# Patient Record
Sex: MALE | Race: WHITE | NOT HISPANIC OR LATINO | Employment: UNEMPLOYED | URBAN - METROPOLITAN AREA
[De-identification: names, ages, dates, MRNs, and addresses within clinical notes are randomized per-mention and may not be internally consistent; named-entity substitution may affect disease eponyms.]

---

## 2020-07-17 ENCOUNTER — OFFICE VISIT - HEALTHEAST (OUTPATIENT)
Dept: FAMILY MEDICINE | Facility: CLINIC | Age: 30
End: 2020-07-17

## 2020-07-17 DIAGNOSIS — F30.9 BIPOLAR I DISORDER, SINGLE MANIC EPISODE (H): ICD-10-CM

## 2020-07-17 DIAGNOSIS — Z72.52 HIGH RISK HOMOSEXUAL BEHAVIOR: ICD-10-CM

## 2020-07-17 DIAGNOSIS — Z59.71 INSURANCE COVERAGE PROBLEMS: ICD-10-CM

## 2020-07-17 RX ORDER — MULTIPLE VITAMINS W/ MINERALS TAB 9MG-400MCG
1 TAB ORAL DAILY
Status: SHIPPED | COMMUNITY
Start: 2020-07-17 | End: 2023-09-06

## 2020-07-17 ASSESSMENT — MIFFLIN-ST. JEOR: SCORE: 1833.29

## 2020-07-20 ENCOUNTER — COMMUNICATION - HEALTHEAST (OUTPATIENT)
Dept: ADMINISTRATIVE | Facility: CLINIC | Age: 30
End: 2020-07-20

## 2020-07-20 ENCOUNTER — COMMUNICATION - HEALTHEAST (OUTPATIENT)
Dept: NURSING | Facility: CLINIC | Age: 30
End: 2020-07-20

## 2020-07-21 ASSESSMENT — ANXIETY QUESTIONNAIRES
3. WORRYING TOO MUCH ABOUT DIFFERENT THINGS: SEVERAL DAYS
IF YOU CHECKED OFF ANY PROBLEMS ON THIS QUESTIONNAIRE, HOW DIFFICULT HAVE THESE PROBLEMS MADE IT FOR YOU TO DO YOUR WORK, TAKE CARE OF THINGS AT HOME, OR GET ALONG WITH OTHER PEOPLE: SOMEWHAT DIFFICULT
4. TROUBLE RELAXING: SEVERAL DAYS
7. FEELING AFRAID AS IF SOMETHING AWFUL MIGHT HAPPEN: NOT AT ALL
2. NOT BEING ABLE TO STOP OR CONTROL WORRYING: MORE THAN HALF THE DAYS
1. FEELING NERVOUS, ANXIOUS, OR ON EDGE: MORE THAN HALF THE DAYS
5. BEING SO RESTLESS THAT IT IS HARD TO SIT STILL: NOT AT ALL
GAD7 TOTAL SCORE: 7
6. BECOMING EASILY ANNOYED OR IRRITABLE: SEVERAL DAYS

## 2020-07-21 ASSESSMENT — PATIENT HEALTH QUESTIONNAIRE - PHQ9: SUM OF ALL RESPONSES TO PHQ QUESTIONS 1-9: 10

## 2020-08-13 ENCOUNTER — COMMUNICATION - HEALTHEAST (OUTPATIENT)
Dept: FAMILY MEDICINE | Facility: CLINIC | Age: 30
End: 2020-08-13

## 2020-08-13 DIAGNOSIS — F30.9 BIPOLAR I DISORDER, SINGLE MANIC EPISODE (H): ICD-10-CM

## 2020-08-19 ENCOUNTER — OFFICE VISIT - HEALTHEAST (OUTPATIENT)
Dept: BEHAVIORAL HEALTH | Facility: CLINIC | Age: 30
End: 2020-08-19

## 2020-08-19 DIAGNOSIS — F30.9 BIPOLAR I DISORDER, SINGLE MANIC EPISODE (H): ICD-10-CM

## 2020-08-19 RX ORDER — QUETIAPINE FUMARATE 50 MG/1
50 TABLET, FILM COATED ORAL AT BEDTIME
Qty: 30 TABLET | Refills: 0 | Status: SHIPPED | OUTPATIENT
Start: 2020-08-19 | End: 2023-09-06

## 2020-08-19 ASSESSMENT — ANXIETY QUESTIONNAIRES
1. FEELING NERVOUS, ANXIOUS, OR ON EDGE: SEVERAL DAYS
4. TROUBLE RELAXING: NOT AT ALL
3. WORRYING TOO MUCH ABOUT DIFFERENT THINGS: SEVERAL DAYS
6. BECOMING EASILY ANNOYED OR IRRITABLE: SEVERAL DAYS
IF YOU CHECKED OFF ANY PROBLEMS ON THIS QUESTIONNAIRE, HOW DIFFICULT HAVE THESE PROBLEMS MADE IT FOR YOU TO DO YOUR WORK, TAKE CARE OF THINGS AT HOME, OR GET ALONG WITH OTHER PEOPLE: SOMEWHAT DIFFICULT
7. FEELING AFRAID AS IF SOMETHING AWFUL MIGHT HAPPEN: SEVERAL DAYS
5. BEING SO RESTLESS THAT IT IS HARD TO SIT STILL: SEVERAL DAYS
GAD7 TOTAL SCORE: 6
2. NOT BEING ABLE TO STOP OR CONTROL WORRYING: SEVERAL DAYS

## 2020-08-19 ASSESSMENT — PATIENT HEALTH QUESTIONNAIRE - PHQ9: SUM OF ALL RESPONSES TO PHQ QUESTIONS 1-9: 5

## 2020-09-12 ENCOUNTER — COMMUNICATION - HEALTHEAST (OUTPATIENT)
Dept: BEHAVIORAL HEALTH | Facility: CLINIC | Age: 30
End: 2020-09-12

## 2020-09-12 DIAGNOSIS — F30.9 BIPOLAR I DISORDER, SINGLE MANIC EPISODE (H): ICD-10-CM

## 2021-05-26 ASSESSMENT — PATIENT HEALTH QUESTIONNAIRE - PHQ9: SUM OF ALL RESPONSES TO PHQ QUESTIONS 1-9: 10

## 2021-05-27 ASSESSMENT — PATIENT HEALTH QUESTIONNAIRE - PHQ9: SUM OF ALL RESPONSES TO PHQ QUESTIONS 1-9: 5

## 2021-05-28 ASSESSMENT — ANXIETY QUESTIONNAIRES
GAD7 TOTAL SCORE: 6
GAD7 TOTAL SCORE: 7

## 2021-06-04 VITALS
HEART RATE: 76 BPM | DIASTOLIC BLOOD PRESSURE: 76 MMHG | HEIGHT: 68 IN | SYSTOLIC BLOOD PRESSURE: 118 MMHG | OXYGEN SATURATION: 94 % | WEIGHT: 201 LBS | BODY MASS INDEX: 30.46 KG/M2

## 2021-06-09 NOTE — PROGRESS NOTES
Chief Complaint   Patient presents with     [facility]     Depression     States that he has been dealing with depression for ever. Was on medication a long time ago but thinks that he stopped due to insurance.      Anxiety       HPI: Patient presents today with worsening depression and anxiety symptoms.  A review of records shows that in the past he has been diagnosed with bipolar and was previously on antipsychotics.  Patient notes that those helped with his mood but he did not like how they made him feel flat.  He had side effects from the him.    Patient denies history of self-harm or suicide attempts.  No current thoughts of hurting himself.  No access to guns.  He enjoys video games and spending time with friends.  Does not exercise.  He does not drink alone.  Occasionally will drink to excess and has a history of a DUI.  He met with psychiatry when he was a minor.  He has never done psychotherapy.  No history of abuse.    Past family medical history includes his mother diagnosed with bipolar.  He thinks that she takes Latuda.    Patient reports having history of same-sex partners.  Cannot remember his last STD check.  He is interested in PrEP.    PHQ and stacey 7 scores recorded in flowsheets.    ROS:Review of Systems - negative except for what's listed in the HPI    SH: The Patient's  reports that he is a non-smoker but has been exposed to tobacco smoke. He has never used smokeless tobacco. He reports current alcohol use of about 1.0 standard drinks of alcohol per week. He reports previous drug use. Drug: Marijuana.      FH: The Patient's family history includes Bipolar disorder in his mother; Clotting disorder in his mother; No Medical Problems in his brother.     Meds:    Current Outpatient Medications on File Prior to Visit   Medication Sig Dispense Refill     Bacillus coagulans/inulin (PROBICHEW ORAL) Take by mouth.       multivitamin with minerals (THERA-M) 9 mg iron-400 mcg Tab tablet Take 1 tablet by  "mouth daily.       UNABLE TO FIND Med Name: Prebiotic       diphenhydrAMINE (BENADRYL) 25 mg capsule Take 25 mg by mouth every 6 (six) hours as needed for itching or allergies (for allergies/itching).       No current facility-administered medications on file prior to visit.        O:  /76   Pulse 76   Ht 5' 7.5\" (1.715 m)   Wt 201 lb (91.2 kg)   SpO2 94%   BMI 31.02 kg/m      Physical Examination:   General appearance - alert, well appearing, and in no distress  Mental status - alert, oriented to person, place, and time  Eyes -PERRLA  Neck -no thyromegaly  Chest - clear to auscultation, no wheezes, rales or rhonchi, symmetric air entry  Heart - normal rate and regular rhythm, S1 and S2 normal, no murmurs noted  Neurological - alert, oriented, normal speech, no focal findings or movement disorder noted, nno tremors, strength 5/5  Extremities - peripheral pulses normal, no pedal edema, no cyanosis  Skin - normal coloration and turgor.  Multiple tattoos      A/P:     Problem List Items Addressed This Visit     Bipolar Disorder - Primary    Relevant Medications    QUEtiapine (SEROQUEL) 50 MG tablet    Other Relevant Orders    Ambulatory referral to Psychiatry      Other Visit Diagnoses     High risk homosexual behavior        Relevant Orders    Ambulatory referral to Infectious Disease    Insurance coverage problems        Relevant Orders    Ambulatory referral to Care Management (Primary Care)            1. Bipolar Disorder    Discussed with the patient that he definitely should get reestablished with psychiatry.  We can reinitiate antipsychotics and try to get him stabilized with affordable medication in the meantime.  Patient denies any risk or thoughts of self-harm.  Should this change, let me know or seek care in the emergency department.  Start Seroquel.  Hopefully within the next month he will have established with psychiatry.  If not, follow-up with me for reevaluation.    - Ambulatory referral to " Psychiatry  - QUEtiapine (SEROQUEL) 50 MG tablet; Take 1 tablet (50 mg total) by mouth at bedtime.  Dispense: 30 tablet; Refill: 0    2. High risk homosexual behavior  Recommended discussing with infectious disease.    - Ambulatory referral to Infectious Disease    3. Insurance coverage problems  Patient lacks insurance.  He is in the process of applying for Medicaid but struggles with the application process.  He may need some help.  Care management referral placed.    - Ambulatory referral to Care Management (Primary Care)        Sharan Caputo, CNP

## 2021-06-09 NOTE — PROGRESS NOTES
Clinic Care Coordination Contact  [facility]/Voicemail       Clinical Data: Care Coordinator Outreach  Outreach attempted x 1.  Left message on patient's voicemail with call back information and requested return call.  Plan: Care Coordinator will try to reach patient again in 1-2 business days.

## 2021-06-09 NOTE — PROGRESS NOTES
Clinic Care Coordination Contact  [facility]/Voicemail       Clinical Data: Care Coordinator Outreach  Outreach attempted x 2.  Left message on patient's voicemail with call back information and requested return call.  Plan: Care Coordinator will send unable to contact letter with care coordinator contact information via mail. Care Coordinator will do no further outreaches at this time.

## 2021-06-09 NOTE — PATIENT INSTRUCTIONS - HE
I sent in the prescription for Quetiapine (seroquel) to your pharmacy to start taking at night. Remember, this starting dose may need to be increased so try not to get too frustrated if you are still having depression type symptoms.    I placed a referral to psychiatry, and they should be calling you to set up an appointment to fine-tune treatment for your depression.    Check back with me in a month so that we can assess your progress and make adjustments as needed.    You should be getting a call from care management to see if they can help you get set up with health insurance.    You also should be getting a call about setting up with infectious disease to look into getting you set up for PrEP.    I gave you the coupon code for the medication which you can pull up on a smart phone or reprint off for future refills if needed.

## 2021-06-09 NOTE — TELEPHONE ENCOUNTER
Please advise on when and how to schedule.    Order Summary [[id number]]   Procedure: Ambulatory referral to Infectious Disease Status: Needs Scheduling   Requested appt date:   Authorizing: Sharan Caputo CNP in [facility]   Referral: [id number] (Not Needed)

## 2021-06-10 NOTE — TELEPHONE ENCOUNTER
Quetiapine fumarate 50mg tab refill request.  One po at bedtime      Established care with Sharan Caputo 7/17/20.   Ref to psych with short refill of seroquel. Pt was to reestablish with psych or f/u for reevaluation with Sharan.       Left message to call back for: patient  Information to relay to patient:  Has he established with psych? If not, needs to be seen by Sharan in clinic

## 2021-06-10 NOTE — PROGRESS NOTES
________________________________________  Medications Phoned  to Pharmacy [] yes [x]no  Name of Pharmacist:  List Medications, including dose, quantity and instructions    Medications ordered this visit were e-scribed.  Verified by order class [x] yes  [] no  Seroquel 50 mg    Medication changes or discontinuations were communicated to patient's pharmacy: [] yes  [x] no    Dictation completed at time of chart check: [x] yes  [] no    I have checked the documentation for today s encounters and the above information has been reviewed and completed.

## 2021-06-10 NOTE — PATIENT INSTRUCTIONS - HE
Continue medications as prescribed  Have your pharmacy contact us for a refill if you are running low on medications (We may ask you to come into clinic to get a refill from the nurse  No Alcohol or drug use  No driving if sedated  Call the clinic with any questions or concerns   Reach out for help if you feel like hurting yourself or others ([facility] [phone]: [address] [facility], [address] or [facility] Suicide Hotline [phone] , call 911 or go to nearest Emergency room    Follow up as directed, for your appointments, per your After Visit Summary Form.

## 2021-06-10 NOTE — PROGRESS NOTES
This video/telephone visit will be conducted via a call between you and your physician/provider. We have found that certain health care needs can be provided without the need for an in-person physical exam. This service lets us provide the care you need with a video /telephone conversation. If a prescription is necessary we can send it directly to your pharmacy. If lab work is needed we can place an order for that and you can then stop by our lab to have the test done at a later time.    Just as we bill insurance for in-person visits, we also bill insurance for video/telephone visits. If you have questions about your insurance coverage, we recommend that you speak with your insurance company.    Patient has given verbal consent for video/Telephone visit? Yes  Patient would like the video visit invitation sent by: Text to cell phone: [phone]     AZEEM/FEDERICO MINA CMA    Patient verified allergies, medications and pharmacy via phone. PHQ: 5, TOM: 6 & Mood (Current): 1 done verbally with writer.  Patient states he is ready for visit.    Unable to review [place] , awaiting access from provider.

## 2021-06-10 NOTE — TELEPHONE ENCOUNTER
Pt notified - appt with psych is 8/19/20. He will discuss refills at that time. Pt states this is a virtual visit. If no refills will be done, he was asked to call us back to inform Sharan.

## 2021-06-10 NOTE — TELEPHONE ENCOUNTER
Left message to call back for: Pt.   Information to relay to patient:  Message below from Carolann

## 2021-06-10 NOTE — PROGRESS NOTES
"Arjun Vickers is a 29 y.o. male who is being evaluated via a billable video visit.      The patient has been notified of following:     \"This video visit will be conducted via a call between you and your physician/provider. We have found that certain health care needs can be provided without the need for an in-person physical exam.  This service lets us provide the care you need with a video conversation.  If a prescription is necessary we can send it directly to your pharmacy.  If lab work is needed we can place an order for that and you can then stop by our lab to have the test done at a later time.    Video visits are billed at different rates depending on your insurance coverage. Please reach out to your insurance provider with any questions.    If during the course of the call the physician/provider feels a video visit is not appropriate, you will not be charged for this service.\"    Patient has given verbal consent to a Video visit? Yes  How would you like to obtain your AVS? AVS Preference: Mail a copy.  If dropped by the video visit, the video invitation should be sent to: Text to cell phone: Phone  Will anyone else be joining your video visit? No        Video Start Time: 1:10 PM          Date of Service:  2020    Name:  Arjun Vickers  :  1990  MRN:  [id number]    HPI:   Arjun Vickers is a 29 y.o. male with a history  of bipolar disorder     Past psychiatric medication  include   Latuda : Over ~ 5 years ago   Lorazepam : Over ~ 5 years ago  Lithium - Allergic    Current medications   Seroquel 50 mg daily : Started ~ one month ago by PCP             Current psychiatric symptoms include :   Depression:Endorses,  present for the past 6 years . Poor motivation , oversleeping,sadness   SI/HI: Denies   Anxiety : Endorses , has been present for ~ 3 years . Over thinking , Shortness of breathe , numbness of limbs  Elana /Hypomania : Endorses  Hypomania symptoms and more depression and some insomnia    Sleep : " "Struggles with sleep,after starting Seroquel he is now sleeping at least 8 hours each night     Psychiatric History:  Current psychiatrist: None   Current psychotherapist: None   Current : None   Diagnoses: Bipolar  Hospitalizations:At 18 years of age - For SI   Suicide attempts: Denies   Electroconvulsive therapy: None   Judicial commitments: None   Anxiety General : None   Social anxiety: None   OCD: None   Elana/hypomania:   PTSD: None   Hallucinations : None   Eating disorder:None   ADHD: None   Personality disorder including history of oppositional defiant disorder or conduct disorder in childhood:None   Hx seizures  : None      Decision Making Capacity   Patient has the capacity to make independent decisions regarding medical and psychiatric care.    Chemical use History:            Denies any recent or current use illicit drugs  Denies CD tx  Uses alcohol at least 1-2 twice a month - Not an issue              Past Medical History:             Past Medical History:   Diagnosis Date     Colitis      Suicidal ideations        Past Surgical History:   Procedure Laterality Date     none          Family Psychiatric History:      Mental illness: Mom - bipolar on Latuda   Addiction:  Denies   Suicide: Denies       Social History:       Marital Status : Singles  Sexual orientation : Anderson   Number of children: Denies   Current living circumstances: Lives with mom   Current sources of financial support: Full employment     Obstetric History:  Last menstrual period: N/A  Pregnancy history: N/A.     History:  Denied  service.    Access to weapons  Denies access to weapons.            Trauma & Abuse History:  Major accidents and injuries:Denies   Concussion or traumatic brain injury: Denies   Abuse: Denies .    Spiritual History:  Sources of hope, meaning, comfort, strength, peace and love: \" Family and friends \"   Part of an organized Synagogue: \" None \" .    Birth & Development History:  [facility]" and state of birth:  [place]   Highest education achieved: High Diploma     Legal History:  DWI : Hx of DUI 3 years ago   Probation/parole status: No longer on probation       [place] Prescription Monitoring Program:  No worrisome pharmacy activity.  Not indicated for this patient.    Medications:   These were reviewed.  Current Outpatient Medications on File Prior to Visit   Medication Sig Dispense Refill     Bacillus coagulans/inulin (PROBICHEW ORAL) Take by mouth.       diphenhydrAMINE (BENADRYL) 25 mg capsule Take 25 mg by mouth every 6 (six) hours as needed for itching or allergies (for allergies/itching).       multivitamin with minerals (THERA-M) 9 mg iron-400 mcg Tab tablet Take 1 tablet by mouth daily.       QUEtiapine (SEROQUEL) 50 MG tablet Take 1 tablet (50 mg total) by mouth at bedtime. 30 tablet 0     UNABLE TO FIND Med Name: Prebiotic       No current facility-administered medications on file prior to visit.        Lab Results:   Personally reviewed and discussed with the patient    Lab Results   Component Value Date    WBC 8.0 10/01/2018    HGB 14.9 10/01/2018    HCT 42.7 10/01/2018     10/01/2018    ALT 9 04/22/2016    AST 13 04/22/2016     10/01/2018    K 3.3 (L) 10/01/2018     10/01/2018    CREATININE 0.78 10/01/2018    BUN 12 10/01/2018    CO2 26 10/01/2018     No results found for: PHENYTOIN, PHENOBARB, VALPROATE, CBMZ        Vital signs:  There were no vitals taken for this visit.    Allergies:   Lithium        Associated Clinical Documents:       Notes reviewed in EPIC and [facility] including: medication reconciliation, progress notes, recent labs, PMH, and OSH records.    ROS:    Subjective data only - Virtual Visit   10 point ROS was negative except for the items listed in HPI.*No Medication s/e's    Review of Systems - General ROS: negative for - chills, fatigue, night sweats, sleep disturbance or weight loss  Respiratory ROS: no cough, shortness of breath, or wheezing  negative  for - cough or shortness of breath  Cardiovascular ROS: no chest pain or dyspnea on exertion  Gastrointestinal ROS: no abdominal pain, change in bowel habits, or black or bloody stools  Musculoskeletal ROS: negative  negative for - gait disturbance, joint pain, joint stiffness, muscle pain or muscular weakness  Neurological ROS: negative for - confusion, gait disturbance, headaches or seizures        MSE:      Alert & oriented x 3.   Appearance: Appears stated age, casually dressed.  Speech: Normal rate, rhythm and tone.  Gait: Normal.  Musculoskeletal: Normal strength, no abnormal movements.  Mood/Affect: Neutral.  Thought Process: Normal rate, logical.  Thought Content: No suicide or homicide ideation.  Associations: Intact, no delusions.  Perceptions: No hallucinations.  Memory: recent and remote memory intact.  Attention span and concentration: normal.  Language: Intact.  Fund of Knowledge: Normal.  Insight and Judgement: Adequate.    Clinical Outcome Measures:  1. PHQ-9: Total score: 5  2. MDQ: Total score : 6  3. TOM-7: 1    Impression:      This is a 29-year-old male with a historical diagnosis of bipolar 2 presenting to the clinic today.  Patient recently started on Seroquel 50 mg by primary care provider.  Past medication trials include lithium and Latuda and lorazepam.  He is allergic to lithium and is not sure what happened to Latuda.  Currently.  Reports symptoms are well managed on Seroquel 50 mg.  Only reports mild symptoms that are manageable and he is able to continue to lead a productive life working full-time.  No necessarily looking for medication changes today but rather for medication management.  We discussed medications benefits and side effects profile including neuroleptic metabolic side effects and the risk of tardive dyskinesia.  Patient denies any side effects.  He endorses understanding and verbally consented to continue with medications.  We will therefore keep him on the same  medications for now.  He will return to the clinic in approximately 4 weeks for follow-up appointment.  Working diagnosis for today will be bipolar 2 disorder      Plan:         Patient and I reviewed diagnosis and treatment plan.   Reviewed risks/benefits of medication with patient.  Ongoing education given regarding diagnostic and treatment options with adequate verbalization of understanding  Patient agrees with following recommendations:    1.  Continue current medications Seroquel 50 mg daily  2.  Highly encouraged psychotherapy-not interested at this time  3.  Return to the clinic in approximately 4 weeks call him to increase his any questions or concerns.      Total Time:      60 Minutes spent on this visit with >50% time spent on  dscussing and educating patient about diagnosis, treatment options, risks, benefits ,side effects of medications and instructions for follow up.  Time also spent on reviewing  Past  EHR from the providers  For better transition of care    This dictation was completed with speech recognition software and there may be unintended word substitutions.        Video-Visit Details    Type of service:  Video Visit    Video End Time (time video stopped): 2:00 PM  Originating Location (pt. Location): Home    Distant Location (provider location):  [facility]     Platform used for Video Visit: Amee Macias NP

## 2021-06-11 NOTE — TELEPHONE ENCOUNTER
Patient has appointment on 9/16/20.  Date of Last Office Visit: 8/1/20  Date of Next Office Visit: 9/16/20  No shows since last visit: none  Cancellations since last visit: none  ED visits since last visit:  none  Medication quetiapine 50mg date last ordered: 8/19/20  Qty: 30  Refills: 0  Lapse in therapy greater than 7 days: no  Medication refill request verified as identical to current order: yes  Result of Last DAM, VPA, Li+ Level, CBC, or Carbamazepine Level (at or since last visit): N/A     [] Medication refilled per [facility] M-1.   [x] Medication unable to be refilled by RN due to criteria not met as indicated below:     []Eligibility - not seen in last year    []Supervision - no future appointment    []Compliance     []Verification - order discrepancy    []Controlled Medication    []Medication not included in RN Protocol    []90 - day supply request    [x]Other - patietn has appt. Before refill is due   Current Medication list:    Bacillus coagulans/inulin (PROBICHEW ORAL)  Take by mouth.    diphenhydrAMINE (BENADRYL) 25 mg capsule  Take 25 mg by mouth every 6 (six) hours as needed for itching or allergies (for allergies/itching).    multivitamin with minerals (THERA-M) 9 mg iron-400 mcg Tab tablet  Take 1 tablet by mouth daily.    QUEtiapine (SEROQUEL) 50 MG tablet  Take 1 tablet (50 mg total) by mouth at bedtime.    UNABLE TO FIND  Med Name: Prebiotic        Medication Plan of Care at last office visit with MD/CNP:    Plan:          Patient and I reviewed diagnosis and treatment plan.   Reviewed risks/benefits of medication with patient.  Ongoing education given regarding diagnostic and treatment options with adequate verbalization of understanding  Patient agrees with following recommendations:     1.  Continue current medications Seroquel 50 mg daily  2.  Highly encouraged psychotherapy-not interested at this time  3.  Return to the clinic in approximately 4 weeks call him to increase his any questions or  concerns.

## 2021-06-20 NOTE — LETTER
Letter by Santy Jack CHW at      Author: Santy Jack CHW Service: -- Author Type: --    Filed:  Encounter Date: 7/20/2020 Status: (Other)       CARE COORDINATION  [facility]  [address] [place] [address]  July 21, 2020    Arjun Vickers  [address] [facility] [address]      Dear Arjun,    I am a clinic community health worker  who works at the [facility] [employer].Below is a description of clinic care coordination and how I can further assist you.      The clinic care coordination team is made up of a registered nurse,  and community health worker who understand the health care system. The goal of clinic care coordination is to help you manage your health and improve access to the health care system in the most efficient manner. The team can assist you in meeting your health care goals by providing education, coordinating services, strengthening the communication among your providers and supporting you with any resource needs.    Please feel free to contact me at [phone] with any questions or concerns. We are focused on providing you with the highest-quality healthcare experience possible and that all starts with you.     Sincerely,     VIKTOR Madera

## 2021-06-26 ENCOUNTER — HEALTH MAINTENANCE LETTER (OUTPATIENT)
Age: 31
End: 2021-06-26

## 2021-10-16 ENCOUNTER — HEALTH MAINTENANCE LETTER (OUTPATIENT)
Age: 31
End: 2021-10-16

## 2022-07-23 ENCOUNTER — HEALTH MAINTENANCE LETTER (OUTPATIENT)
Age: 32
End: 2022-07-23

## 2022-10-01 ENCOUNTER — HEALTH MAINTENANCE LETTER (OUTPATIENT)
Age: 32
End: 2022-10-01

## 2022-10-11 PROCEDURE — 99284 EMERGENCY DEPT VISIT MOD MDM: CPT

## 2022-10-12 ENCOUNTER — HOSPITAL ENCOUNTER (EMERGENCY)
Facility: CLINIC | Age: 32
Discharge: HOME | End: 2022-10-12
Attending: EMERGENCY MEDICINE | Admitting: EMERGENCY MEDICINE
Payer: COMMERCIAL

## 2022-10-12 VITALS — HEART RATE: 66 BPM | OXYGEN SATURATION: 94 %

## 2022-10-12 DIAGNOSIS — R07.9 CHEST PAIN, UNSPECIFIED TYPE: ICD-10-CM

## 2022-10-12 LAB
ATRIAL RATE - MUSE: 83 BPM
DIASTOLIC BLOOD PRESSURE - MUSE: NORMAL MMHG
INTERPRETATION ECG - MUSE: NORMAL
P AXIS - MUSE: 67 DEGREES
PR INTERVAL - MUSE: 144 MS
QRS DURATION - MUSE: 84 MS
QT - MUSE: 372 MS
QTC - MUSE: 437 MS
R AXIS - MUSE: 60 DEGREES
SYSTOLIC BLOOD PRESSURE - MUSE: NORMAL MMHG
T AXIS - MUSE: 58 DEGREES
VENTRICULAR RATE- MUSE: 83 BPM

## 2022-10-12 PROCEDURE — 93005 ELECTROCARDIOGRAM TRACING: CPT | Performed by: EMERGENCY MEDICINE

## 2022-10-12 RX ORDER — FAMOTIDINE 20 MG/1
20 TABLET, FILM COATED ORAL 2 TIMES DAILY
Qty: 60 TABLET | Refills: 0 | Status: SHIPPED | OUTPATIENT
Start: 2022-10-12 | End: 2022-11-11

## 2022-10-12 RX ORDER — HYDROXYZINE PAMOATE 50 MG/1
50 CAPSULE ORAL 3 TIMES DAILY PRN
Qty: 21 CAPSULE | Refills: 0 | Status: SHIPPED | OUTPATIENT
Start: 2022-10-12 | End: 2022-10-19

## 2022-10-12 ASSESSMENT — ACTIVITIES OF DAILY LIVING (ADL): ADLS_ACUITY_SCORE: 35

## 2022-10-12 ASSESSMENT — ENCOUNTER SYMPTOMS
FEVER: 0
CHILLS: 0
ABDOMINAL PAIN: 0
COUGH: 0

## 2022-10-12 NOTE — ED PROVIDER NOTES
EMERGENCY DEPARTMENT ENCOUNTER       ED Course & Medical Decision Making     1:09 AM I met with the patient and performed my initial interview and exam     2:04 AM I rechecked the patient        I saw and examined the patient.  IV was established and he was placed on the monitor.  Diagnostics were ordered.    I did independently interpret EKG that was done in triage, October 11 2354.    Normal sinus rhythm with a rate of 83 bpm.  Intervals and axis are normal.  No significant ST elevation or depression.  No pathological Q waves.  Similar to EKG from October 1, 2018.    He was given a GI cocktail and he states that his symptoms have since resolved.    He is declining any further diagnostic testing which has already been ordered.  I did meet back up with him and let him know that the work-up was not completed and I cannot give him a clear-cut answer as are 100% reassurance based on this work-up.  He understands this and accepts the risk.  He has decision-making capacity and is symptom-free.  He will be discharged to follow-up with primary care.          Prior to making a final disposition on this patient the results of patient's tests and other diagnostic studies were discussed with the patient. All questions were answered. Patient expressed understanding of the plan and was amenable to it.    Medications   lidocaine (viscous) (XYLOCAINE) 2 % 15 mL, alum & mag hydroxide-simethicone (MAALOX) 15 mL GI Cocktail (has no administration in time range)       Final Impression     1. Chest pain, unspecified type            Chief Complaint     Chief Complaint   Patient presents with     Chest Pain     Anxiety       Pt says that he started to fell chest pain tonight. He thought it was heartburn and took Tums. He did not have relief so he thought maybe it was his anxiety. But he is unable to use his normal methods of going outside and driving to help with his anxiety. He got concerned and came in to be checked out.      Triage  "Assessment     Row Name 10/11/22 1330       Triage Assessment (Adult)    Airway WDL WDL       Respiratory WDL    Respiratory WDL WDL       Skin Circulation/Temperature WDL    Skin Circulation/Temperature WDL WDL       Cardiac WDL    Cardiac WDL X;chest pain       Chest Pain Assessment    Chest Pain Location midsternal    Chest Pain Radiation other (see comments)  None    Character other (see comments)  Dull    Duration Tonight    Precipitating Factors at rest    Alleviating Factors nothing    Associated Signs/Symptoms anxiety;hypertension    Chest Pain Intervention 12-lead ECG obtained       Peripheral/Neurovascular WDL    Peripheral Neurovascular WDL WDL       Cognitive/Neuro/Behavioral WDL    Cognitive/Neuro/Behavioral WDL WDL                  HPI       Arjun Vickers is a 32 year old male who presents for evaluation of chest pain     Patient reports that around 8 PM while sitting in bed watching TV, they began to experience \"mild\" mid sternal \"chest pain\" that \"then out of nowhere\" it became \"really bad\". The patient initially thought that it could be an anxiety attack. The patient notes that the pain comes and goes and they feel it with movement. The patient notes that the pain got \"really bad\" when eating. The patient denies personal history of blood clots, but does note that their mother had a blood clot in the leg. The patient is not on any heart or lung medication. The patient denies cough, chills, fever, abdominal pain, bowel movement changes, or urinary changes.     I, Aileen Zamora am serving as a scribe to document services personally performed by Geovany Julio M.D. based on my observation and the provider's statements to me. I, Geovany Julio M.D attest that Aileen Zamora is acting in a scribe capacity, has observed my performance of the services and has documented them in accordance with my direction.    Past Medical History     Past Medical History:   Diagnosis Date     Colitis      Suicidal ideations "      Past Surgical History:   Procedure Laterality Date     OTHER SURGICAL HISTORY      none     Family History   Problem Relation Age of Onset     Bipolar Disorder Mother      Clotting Disorder Mother      No Known Problems Brother       Social History     Tobacco Use     Smoking status: Passive Smoke Exposure - Never Smoker     Smokeless tobacco: Never   Substance Use Topics     Alcohol use: Yes     Alcohol/week: 1.0 standard drink     Comment: Alcoholic Drinks/day: occasionally binge     Drug use: Not Currently     Types: Marijuana       Relevant past medical, surgical, family and social history as documented above, has been reviewed and discussed with patient. No changes or additions, unless otherwise noted in the HPI.    Current Medications     famotidine (PEPCID) 20 MG tablet  hydrOXYzine (VISTARIL) 50 MG capsule  diphenhydrAMINE (BENADRYL) 25 mg capsule  MEDICATION CANNOT BE REORDERED - PLEASE MANUALLY REORDER AND DISCONTINUE THE OLD ORDER  multivitamin with minerals (THERA-M) 9 mg iron-400 mcg Tab tablet  QUEtiapine (SEROQUEL) 50 MG tablet  UNABLE TO FIND        Allergies     Allergies   Allergen Reactions     Lithium Rash       Review of Systems     Review of Systems   Constitutional: Negative for chills and fever.   Respiratory: Negative for cough.    Cardiovascular: Positive for chest pain.   Gastrointestinal: Negative for abdominal pain.   Genitourinary: Negative.    All other systems reviewed and are negative.       Remainder of systems reviewed, unless noted in HPI all others negative.    Physical Exam     Pulse 66   SpO2 94%     Physical Exam  Vitals and nursing note reviewed.   Constitutional:       General: He is not in acute distress.  HENT:      Head: Normocephalic.      Nose: Nose normal.   Eyes:      General: No scleral icterus.  Cardiovascular:      Rate and Rhythm: Normal rate.   Pulmonary:      Effort: Pulmonary effort is normal.   Musculoskeletal:      Cervical back: Neck supple.   Skin:      Findings: No rash.   Neurological:      Mental Status: He is alert. Mental status is at baseline.   Psychiatric:         Mood and Affect: Mood normal.             Labs & Imaging         Labs Ordered and Resulted from Time of ED Arrival to Time of ED Departure - No data to display      Results for orders placed or performed during the hospital encounter of 10/12/22   ECG 12-LEAD WITH MUSE ([facility])   Result Value Ref Range    Systolic Blood Pressure  mmHg    Diastolic Blood Pressure  mmHg    Ventricular Rate 83 BPM    Atrial Rate 83 BPM    IL Interval 144 ms    QRS Duration 84 ms     ms    QTc 437 ms    P Axis 67 degrees    R AXIS 60 degrees    T Axis 58 degrees    Interpretation ECG       Sinus rhythm  Possible Left atrial enlargement  Borderline ECG  When compared with ECG of 01-OCT-2018 19:37,  No significant change was found  Confirmed by SEE ED PROVIDER NOTE FOR, ECG INTERPRETATION ([id number]),  IMELDA SMITH ([id number]) on 10/12/2022 12:16:21 AM         Geovany Julio MD  Emergency Medicine  [facility] EMERGENCY ROOM  [facility] [facility] [address]  [phone]  10/12/2022       Geovany Julio MD  10/12/22 0247

## 2022-10-12 NOTE — ED NOTES
Pt reports he is scared of IV's. He reports his chest pain is resolved at this time. Pt reports he would like to discharge to home and sleep.

## 2022-10-12 NOTE — ED TRIAGE NOTES
Pt says that he started to fell chest pain tonight. He thought it was heartburn and took Tums. He did not have relief so he thought maybe it was his anxiety. But he is unable to use his normal methods of going outside and driving to help with his anxiety. He got concerned and came in to be checked out.      Triage Assessment     Row Name 10/11/22 7297       Triage Assessment (Adult)    Airway WDL WDL       Respiratory WDL    Respiratory WDL WDL       Skin Circulation/Temperature WDL    Skin Circulation/Temperature WDL WDL       Cardiac WDL    Cardiac WDL X;chest pain       Chest Pain Assessment    Chest Pain Location midsternal    Chest Pain Radiation other (see comments)  None    Character other (see comments)  Dull    Duration Tonight    Precipitating Factors at rest    Alleviating Factors nothing    Associated Signs/Symptoms anxiety;hypertension    Chest Pain Intervention 12-lead ECG obtained       Peripheral/Neurovascular WDL    Peripheral Neurovascular WDL WDL       Cognitive/Neuro/Behavioral WDL    Cognitive/Neuro/Behavioral WDL WDL

## 2023-08-06 ENCOUNTER — HEALTH MAINTENANCE LETTER (OUTPATIENT)
Age: 33
End: 2023-08-06

## 2023-09-06 ENCOUNTER — VIRTUAL VISIT (OUTPATIENT)
Dept: FAMILY MEDICINE | Facility: CLINIC | Age: 33
End: 2023-09-06
Payer: COMMERCIAL

## 2023-09-06 DIAGNOSIS — F30.9 BIPOLAR I DISORDER, SINGLE MANIC EPISODE (H): Primary | ICD-10-CM

## 2023-09-06 DIAGNOSIS — F41.0 PANIC ATTACK: ICD-10-CM

## 2023-09-06 DIAGNOSIS — F41.1 GENERALIZED ANXIETY DISORDER: ICD-10-CM

## 2023-09-06 DIAGNOSIS — Z79.899 MEDICATION MANAGEMENT: ICD-10-CM

## 2023-09-06 PROCEDURE — 99204 OFFICE O/P NEW MOD 45 MIN: CPT | Mod: VID | Performed by: STUDENT IN AN ORGANIZED HEALTH CARE EDUCATION/TRAINING PROGRAM

## 2023-09-06 RX ORDER — PROPRANOLOL HYDROCHLORIDE 20 MG/1
TABLET ORAL
Qty: 60 TABLET | Refills: 1 | Status: SHIPPED | OUTPATIENT
Start: 2023-09-06 | End: 2023-11-15 | Stop reason: SINTOL

## 2023-09-06 RX ORDER — QUETIAPINE FUMARATE 25 MG/1
TABLET, FILM COATED ORAL
Qty: 60 TABLET | Refills: 2 | Status: SHIPPED | OUTPATIENT
Start: 2023-09-06 | End: 2023-11-15 | Stop reason: ALTCHOICE

## 2023-09-06 ASSESSMENT — ANXIETY QUESTIONNAIRES
1. FEELING NERVOUS, ANXIOUS, OR ON EDGE: NEARLY EVERY DAY
7. FEELING AFRAID AS IF SOMETHING AWFUL MIGHT HAPPEN: NEARLY EVERY DAY
6. BECOMING EASILY ANNOYED OR IRRITABLE: NOT AT ALL
IF YOU CHECKED OFF ANY PROBLEMS ON THIS QUESTIONNAIRE, HOW DIFFICULT HAVE THESE PROBLEMS MADE IT FOR YOU TO DO YOUR WORK, TAKE CARE OF THINGS AT HOME, OR GET ALONG WITH OTHER PEOPLE: VERY DIFFICULT
GAD7 TOTAL SCORE: 13
3. WORRYING TOO MUCH ABOUT DIFFERENT THINGS: NEARLY EVERY DAY
5. BEING SO RESTLESS THAT IT IS HARD TO SIT STILL: NOT AT ALL
2. NOT BEING ABLE TO STOP OR CONTROL WORRYING: NEARLY EVERY DAY
GAD7 TOTAL SCORE: 13

## 2023-09-06 ASSESSMENT — PATIENT HEALTH QUESTIONNAIRE - PHQ9
5. POOR APPETITE OR OVEREATING: SEVERAL DAYS
SUM OF ALL RESPONSES TO PHQ QUESTIONS 1-9: 1

## 2023-09-06 ASSESSMENT — ENCOUNTER SYMPTOMS: NERVOUS/ANXIOUS: 1
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
## 2023-09-07 RX ORDER — PROPRANOLOL HYDROCHLORIDE 20 MG/1
TABLET ORAL
Qty: 180 TABLET | OUTPATIENT
Start: 2023-09-07

## 2023-11-09 ASSESSMENT — ANXIETY QUESTIONNAIRES
3. WORRYING TOO MUCH ABOUT DIFFERENT THINGS: NEARLY EVERY DAY
1. FEELING NERVOUS, ANXIOUS, OR ON EDGE: NEARLY EVERY DAY
4. TROUBLE RELAXING: NEARLY EVERY DAY
5. BEING SO RESTLESS THAT IT IS HARD TO SIT STILL: NEARLY EVERY DAY
2. NOT BEING ABLE TO STOP OR CONTROL WORRYING: NEARLY EVERY DAY
7. FEELING AFRAID AS IF SOMETHING AWFUL MIGHT HAPPEN: NEARLY EVERY DAY
6. BECOMING EASILY ANNOYED OR IRRITABLE: MORE THAN HALF THE DAYS
GAD7 TOTAL SCORE: 20
GAD7 TOTAL SCORE: 20
IF YOU CHECKED OFF ANY PROBLEMS ON THIS QUESTIONNAIRE, HOW DIFFICULT HAVE THESE PROBLEMS MADE IT FOR YOU TO DO YOUR WORK, TAKE CARE OF THINGS AT HOME, OR GET ALONG WITH OTHER PEOPLE: EXTREMELY DIFFICULT

## 2023-11-12 ENCOUNTER — HOSPITAL ENCOUNTER (EMERGENCY)
Facility: CLINIC | Age: 33
Discharge: HOME | End: 2023-11-12
Attending: FAMILY MEDICINE | Admitting: FAMILY MEDICINE
Payer: COMMERCIAL

## 2023-11-12 ENCOUNTER — APPOINTMENT (OUTPATIENT)
Dept: RADIOLOGY | Facility: CLINIC | Age: 33
End: 2023-11-12
Attending: FAMILY MEDICINE
Payer: COMMERCIAL

## 2023-11-12 VITALS
HEIGHT: 66 IN | TEMPERATURE: 98.5 F | WEIGHT: 219 LBS | BODY MASS INDEX: 35.2 KG/M2 | OXYGEN SATURATION: 96 % | SYSTOLIC BLOOD PRESSURE: 127 MMHG | RESPIRATION RATE: 21 BRPM | DIASTOLIC BLOOD PRESSURE: 74 MMHG | HEART RATE: 68 BPM

## 2023-11-12 DIAGNOSIS — M94.0 COSTOCHONDRITIS: ICD-10-CM

## 2023-11-12 LAB
ANION GAP SERPL CALCULATED.3IONS-SCNC: 13 MMOL/L (ref 7–15)
ATRIAL RATE - MUSE: 78 BPM
BASOPHILS # BLD AUTO: 0 10E3/UL (ref 0–0.2)
BASOPHILS NFR BLD AUTO: 0 %
BUN SERPL-MCNC: 9.2 MG/DL (ref 6–20)
CALCIUM SERPL-MCNC: 9.3 MG/DL (ref 8.6–10)
CHLORIDE SERPL-SCNC: 108 MMOL/L (ref 98–107)
CREAT SERPL-MCNC: 0.79 MG/DL (ref 0.67–1.17)
DEPRECATED HCO3 PLAS-SCNC: 19 MMOL/L (ref 22–29)
DIASTOLIC BLOOD PRESSURE - MUSE: NORMAL MMHG
EGFRCR SERPLBLD CKD-EPI 2021: >90 ML/MIN/1.73M2
EOSINOPHIL # BLD AUTO: 0.2 10E3/UL (ref 0–0.7)
EOSINOPHIL NFR BLD AUTO: 2 %
ERYTHROCYTE [DISTWIDTH] IN BLOOD BY AUTOMATED COUNT: 12.3 % (ref 10–15)
GLUCOSE SERPL-MCNC: 100 MG/DL (ref 70–99)
HCT VFR BLD AUTO: 49.3 % (ref 40–53)
HGB BLD-MCNC: 17 G/DL (ref 13.3–17.7)
IMM GRANULOCYTES # BLD: 0 10E3/UL
IMM GRANULOCYTES NFR BLD: 0 %
INTERPRETATION ECG - MUSE: NORMAL
LYMPHOCYTES # BLD AUTO: 2.6 10E3/UL (ref 0.8–5.3)
LYMPHOCYTES NFR BLD AUTO: 38 %
MAGNESIUM SERPL-MCNC: 2.2 MG/DL (ref 1.7–2.3)
MCH RBC QN AUTO: 31.1 PG (ref 26.5–33)
MCHC RBC AUTO-ENTMCNC: 34.5 G/DL (ref 31.5–36.5)
MCV RBC AUTO: 90 FL (ref 78–100)
MONOCYTES # BLD AUTO: 0.4 10E3/UL (ref 0–1.3)
MONOCYTES NFR BLD AUTO: 6 %
NEUTROPHILS # BLD AUTO: 3.6 10E3/UL (ref 1.6–8.3)
NEUTROPHILS NFR BLD AUTO: 54 %
NRBC # BLD AUTO: 0 10E3/UL
NRBC BLD AUTO-RTO: 0 /100
P AXIS - MUSE: 61 DEGREES
PLATELET # BLD AUTO: 232 10E3/UL (ref 150–450)
POTASSIUM SERPL-SCNC: 4.1 MMOL/L (ref 3.4–5.3)
PR INTERVAL - MUSE: 152 MS
QRS DURATION - MUSE: 86 MS
QT - MUSE: 366 MS
QTC - MUSE: 417 MS
R AXIS - MUSE: 17 DEGREES
RBC # BLD AUTO: 5.46 10E6/UL (ref 4.4–5.9)
SODIUM SERPL-SCNC: 140 MMOL/L (ref 135–145)
SYSTOLIC BLOOD PRESSURE - MUSE: NORMAL MMHG
T AXIS - MUSE: 23 DEGREES
TROPONIN T SERPL HS-MCNC: <6 NG/L
TSH SERPL DL<=0.005 MIU/L-ACNC: 0.75 UIU/ML (ref 0.3–4.2)
VENTRICULAR RATE- MUSE: 78 BPM
WBC # BLD AUTO: 6.8 10E3/UL (ref 4–11)

## 2023-11-12 PROCEDURE — 83735 ASSAY OF MAGNESIUM: CPT | Performed by: FAMILY MEDICINE

## 2023-11-12 PROCEDURE — 93005 ELECTROCARDIOGRAM TRACING: CPT | Performed by: FAMILY MEDICINE

## 2023-11-12 PROCEDURE — 84443 ASSAY THYROID STIM HORMONE: CPT | Performed by: FAMILY MEDICINE

## 2023-11-12 PROCEDURE — 36415 COLL VENOUS BLD VENIPUNCTURE: CPT | Performed by: FAMILY MEDICINE

## 2023-11-12 PROCEDURE — 71046 X-RAY EXAM CHEST 2 VIEWS: CPT

## 2023-11-12 PROCEDURE — 80048 BASIC METABOLIC PNL TOTAL CA: CPT | Performed by: FAMILY MEDICINE

## 2023-11-12 PROCEDURE — 84484 ASSAY OF TROPONIN QUANT: CPT | Performed by: FAMILY MEDICINE

## 2023-11-12 PROCEDURE — 99285 EMERGENCY DEPT VISIT HI MDM: CPT | Mod: 25

## 2023-11-12 PROCEDURE — 85025 COMPLETE CBC W/AUTO DIFF WBC: CPT | Performed by: FAMILY MEDICINE

## 2023-11-12 ASSESSMENT — ENCOUNTER SYMPTOMS: LIGHT-HEADEDNESS: 1

## 2023-11-12 ASSESSMENT — ACTIVITIES OF DAILY LIVING (ADL): ADLS_ACUITY_SCORE: 35

## 2023-11-12 NOTE — ED TRIAGE NOTES
"Pt with diagnosed anxiety presents with concerns of a low heart rate after taking Propanolol for his anxiety last night.  Heart rate was 50. Today he has had shooting pains in his chest. \"Like electric shocks\".  Episodes last several seconds.     Triage Assessment (Adult)       Row Name 11/12/23 0916          Triage Assessment    Airway WDL WDL        Respiratory WDL    Respiratory WDL WDL        Skin Circulation/Temperature WDL    Skin Circulation/Temperature WDL WDL        Cardiac WDL    Cardiac WDL WDL        Peripheral/Neurovascular WDL    Peripheral Neurovascular WDL WDL        Cognitive/Neuro/Behavioral WDL    Cognitive/Neuro/Behavioral WDL WDL                     "

## 2023-11-12 NOTE — ED PROVIDER NOTES
EMERGENCY DEPARTMENT ENCOUNTER      NAME: Arjun Vickers  AGE: 33 year old male  YOB: 1990  MRN: [id number]  EVALUATION DATE & TIME: 11/12/2023  5:26 PM    PCP: Sharan Caputo    ED PROVIDER: Mono iQu M.D.    Chief Complaint   Patient presents with    Anxiety    Bradycardia       FINAL IMPRESSION:  1. Costochondritis        ED COURSE & MEDICAL DECISION MAKING:    Pertinent Labs & Imaging studies independently interpreted by me. (See chart for details)  6:09 PM  Patient seen and examined, reviewed most recent primary care note which was a virtual visit on September 6, at that time bipolar disorder was addressed and Seroquel was prescribed, also addressed anxiety and panic, propanolol was initiated and referral to behavioral health was made.  Patient presents today with concerns of chest pain which has been going on for several months, along his breastbone he feels crackling with stretching his arms back.  Also some sharp electrical pains in the left side of the chest.  He also notes that after taking propanolol yesterday his heart rate was in the 50s.  On exam here, heart rate in the 70s to 80s, patient appears comfortable.  He does have some tenderness along the bilateral sternal borders and some crepitus with moving.  Chest pain seems musculoskeletal, labs are ordered and will evaluate for other causes of chest pain and bradycardia.  7:19 PM labs independently interpreted by me with reassuring basic panel, negative troponin, normal TSH.  Magnesium and CBC are still pending.  If these are negative, patient can be discharged.  7:58 PM has not been interpreted by me with normal magnesium and normal CBC.  Chest x-ray independently interpreted by me negative for acute findings.      At the conclusion of the encounter I discussed the results of all of the tests and the disposition. The questions were answered. The patient or family acknowledged understanding and was agreeable with the care plan.  "    Medical Decision Making    History:  Supplemental history from: Documented in chart, if applicable  External Record(s) reviewed: Documented in chart, if applicable.    Work Up:  Chart documentation includes differential considered and any EKGs or imaging independently interpreted by provider, where specified.  In additional to work up documented, I considered the following work up: Documented in chart, if applicable.    External consultation:  Discussion of management with another provider: Documented in chart, if applicable    Complicating factors:  Care impacted by chronic illness: Mental Health  Care affected by social determinants of health: Access to Affordable Health Care    Disposition considerations: Discharge. No recommendations on prescription strength medication(s). N/A.    EKG:    Performed at: 5:24 PM  Impression: Normal EKG  Rate: 78  Rhythm: Sinus  Axis: Normal  KS Interval: 152  QRS Interval: 86  QTc Interval: 417  ST Changes: No acute ischemic changes  Comparison: October 2022, no change    I have independently reviewed and interpreted the EKG(s) documented above.    PROCEDURES:       MEDICATIONS GIVEN IN THE EMERGENCY:  Medications - No data to display    NEW PRESCRIPTIONS STARTED AT TODAY'S ER VISIT  New Prescriptions    No medications on file       =================================================================    HPI    Patient information was obtained from: Patient and patient's friend      Arjun Vickers is a 33 year old male with a pertinent history of colitis and bipolar disorder, who presents to this ED via walk-in for evaluation of anxiety and bradycardia.    Patient reports that he feels an \"electric shock\" feeling in the middle of his chest and he endorses pain underneath his left breast near ribcage that radiates down his left arm. He states the pain does go away when he changes positions. Notes that in near his sternum  he hears \"crunching\" sounds when he moves both his arms back " and when he stands up, stating this has been ongoing for a month, but recently worsened. Reports he took Propanolol last night for anxiety, and his heart rate was 56, noting his heart rate was never that low. Earlier in the day, he was lightheaded and felt like he was going to faint, noting his heart rate was between 58 to 62. Notes he feels shock feeling near the middle towards both sides of chest that comes and goes. He takes Propanolol and hydroxyzine, but states that it doesn't help. No other medical problems. He doesn't smoke and denies alcohol use, but is exposed to second hand smoke, per patient's friend. No other reported complaints or concerns at this time.    REVIEW OF SYSTEMS   Review of Systems   Cardiovascular:  Positive for chest pain (center of chest).   Neurological:  Positive for light-headedness.   All other systems reviewed and are negative.     All other systems reviewed and negative    PAST MEDICAL HISTORY:  Past Medical History:   Diagnosis Date    Colitis     Suicidal ideations        PAST SURGICAL HISTORY:  Past Surgical History:   Procedure Laterality Date    OTHER SURGICAL HISTORY      none       CURRENT MEDICATIONS:    No current facility-administered medications for this encounter.     Current Outpatient Medications   Medication    propranolol (INDERAL) 20 MG tablet    QUEtiapine (SEROQUEL) 25 MG tablet       ALLERGIES:  Allergies   Allergen Reactions    Lithium Rash       FAMILY HISTORY:  Family History   Problem Relation Age of Onset    Bipolar Disorder Mother     Clotting Disorder Mother     No Known Problems Brother        SOCIAL HISTORY:   Social History     Socioeconomic History    Marital status: Single    Number of children: 0    Years of education: 12   Tobacco Use    Smoking status: Passive Smoke Exposure - Never Smoker    Smokeless tobacco: Never   Substance and Sexual Activity    Alcohol use: Yes     Alcohol/week: 1.0 standard drink of alcohol     Comment: Alcoholic  "Drinks/day: occasionally binge    Drug use: Not Currently     Types: Marijuana    Sexual activity: Yes     Partners: Male       VITALS:  /74   Pulse 68   Temp 98.5  F (36.9  C)   Resp 21   Ht 1.676 m (5' 6\")   Wt 99.3 kg (219 lb)   SpO2 96%   BMI 35.35 kg/m      PHYSICAL EXAM:  Physical Exam  Constitutional:       General: He is not in acute distress.     Appearance: He is well-developed. He is not diaphoretic.   HENT:      Head: Normocephalic and atraumatic.      Nose: No congestion.      Mouth/Throat:      Mouth: Mucous membranes are moist.   Eyes:      General: No scleral icterus.     Conjunctiva/sclera: Conjunctivae normal.   Cardiovascular:      Rate and Rhythm: Normal rate.   Pulmonary:      Effort: Pulmonary effort is normal.   Abdominal:      General: Abdomen is flat.   Musculoskeletal:      Cervical back: Normal range of motion and neck supple.   Skin:     General: Skin is warm and dry.      Capillary Refill: Capillary refill takes less than 2 seconds.      Findings: No rash.   Neurological:      Mental Status: He is alert and oriented to person, place, and time.          LAB:  All pertinent labs reviewed and interpreted.  Results for orders placed or performed during the hospital encounter of 11/12/23   Basic metabolic panel   Result Value Ref Range    Sodium 140 135 - 145 mmol/L    Potassium 4.1 3.4 - 5.3 mmol/L    Chloride 108 (H) 98 - 107 mmol/L    Carbon Dioxide (CO2) 19 (L) 22 - 29 mmol/L    Anion Gap 13 7 - 15 mmol/L    Urea Nitrogen 9.2 6.0 - 20.0 mg/dL    Creatinine 0.79 0.67 - 1.17 mg/dL    GFR Estimate >90 >60 mL/min/1.73m2    Calcium 9.3 8.6 - 10.0 mg/dL    Glucose 100 (H) 70 - 99 mg/dL   Result Value Ref Range    Troponin T, High Sensitivity <6 <=22 ng/L   Result Value Ref Range    Magnesium 2.2 1.7 - 2.3 mg/dL   TSH with free T4 reflex   Result Value Ref Range    TSH 0.75 0.30 - 4.20 uIU/mL   CBC with platelets and differential   Result Value Ref Range    WBC Count 6.8 4.0 - 11.0 " 10e3/uL    RBC Count 5.46 4.40 - 5.90 10e6/uL    Hemoglobin 17.0 13.3 - 17.7 g/dL    Hematocrit 49.3 40.0 - 53.0 %    MCV 90 78 - 100 fL    MCH 31.1 26.5 - 33.0 pg    MCHC 34.5 31.5 - 36.5 g/dL    RDW 12.3 10.0 - 15.0 %    Platelet Count 232 150 - 450 10e3/uL    % Neutrophils 54 %    % Lymphocytes 38 %    % Monocytes 6 %    % Eosinophils 2 %    % Basophils 0 %    % Immature Granulocytes 0 %    NRBCs per 100 WBC 0 <1 /100    Absolute Neutrophils 3.6 1.6 - 8.3 10e3/uL    Absolute Lymphocytes 2.6 0.8 - 5.3 10e3/uL    Absolute Monocytes 0.4 0.0 - 1.3 10e3/uL    Absolute Eosinophils 0.2 0.0 - 0.7 10e3/uL    Absolute Basophils 0.0 0.0 - 0.2 10e3/uL    Absolute Immature Granulocytes 0.0 <=0.4 10e3/uL    Absolute NRBCs 0.0 10e3/uL   ECG 12-LEAD WITH MUSE (LHE)   Result Value Ref Range    Systolic Blood Pressure  mmHg    Diastolic Blood Pressure  mmHg    Ventricular Rate 78 BPM    Atrial Rate 78 BPM    MN Interval 152 ms    QRS Duration 86 ms     ms    QTc 417 ms    P Axis 61 degrees    R AXIS 17 degrees    T Axis 23 degrees    Interpretation ECG       Sinus rhythm  Normal ECG  When compared with ECG of 11-OCT-2022 23:54,  No significant change was found  Confirmed by SEE ED PROVIDER NOTE FOR, ECG INTERPRETATION ([id number]),  MALISSA HERNANDEZ ([id number]) on 11/12/2023 7:56:12 PM         RADIOLOGY:  Reviewed all pertinent imaging. Please see official radiology report.  Chest XR,  PA & LAT    (Results Pending)       I, Pratibha Smith, am serving as a scribe to document services personally performed by Dr. Qiu based on my observation and the provider's statements to me. I, Mono Qiu MD attest that Pratibha Smith is acting in a scribe capacity, has observed my performance of the services and has documented them in accordance with my direction.    Mono Qiu M.D.  Emergency Medicine  [facility] EMERGENCY ROOM  [address]  [address]  [phone]  Dept: [phone]       Mono Qiu MD  11/12/23 1958

## 2023-11-13 NOTE — ED NOTES
Introduced self to pt, he has some questions about lab results which this writer answered to the best of my ability. He denies any additional questions at this time, denies CP or SOB. He is alert and oriented, ABCs intact.    Of note, needing redraw purple top, okay for straight stick per MD

## 2023-11-13 NOTE — DISCHARGE INSTRUCTIONS
Take all ibuprofen as needed for pain.  Follow-up with your primary care doctor.    Do not take propanolol for now, or take lower dose

## 2023-11-13 NOTE — ED NOTES
Pt remains A&O, in no acute distress, ABCs intact and VSS. Pt and support person are agreeable with plan to discharge home.

## 2023-11-15 ENCOUNTER — VIRTUAL VISIT (OUTPATIENT)
Dept: PSYCHIATRY | Facility: CLINIC | Age: 33
End: 2023-11-15
Payer: COMMERCIAL

## 2023-11-15 DIAGNOSIS — F41.0 PANIC ATTACK: ICD-10-CM

## 2023-11-15 DIAGNOSIS — F41.1 GENERALIZED ANXIETY DISORDER: Primary | ICD-10-CM

## 2023-11-15 DIAGNOSIS — Z86.59 HX OF BIPOLAR DISORDER: ICD-10-CM

## 2023-11-15 DIAGNOSIS — F39 MOOD DISORDER (H): ICD-10-CM

## 2023-11-15 PROCEDURE — 99204 OFFICE O/P NEW MOD 45 MIN: CPT | Mod: 95 | Performed by: PSYCHIATRY & NEUROLOGY

## 2023-11-15 RX ORDER — ESCITALOPRAM OXALATE 10 MG/1
10 TABLET ORAL DAILY
Qty: 30 TABLET | Refills: 1 | Status: SHIPPED | OUTPATIENT
Start: 2023-11-15 | End: 2023-12-27 | Stop reason: SINTOL

## 2023-11-15 RX ORDER — OLANZAPINE 5 MG/1
5 TABLET ORAL DAILY PRN
Qty: 30 TABLET | Refills: 1 | Status: SHIPPED | OUTPATIENT
Start: 2023-11-15 | End: 2023-12-27

## 2023-11-15 ASSESSMENT — PATIENT HEALTH QUESTIONNAIRE - PHQ9
SUM OF ALL RESPONSES TO PHQ QUESTIONS 1-9: 13
10. IF YOU CHECKED OFF ANY PROBLEMS, HOW DIFFICULT HAVE THESE PROBLEMS MADE IT FOR YOU TO DO YOUR WORK, TAKE CARE OF THINGS AT HOME, OR GET ALONG WITH OTHER PEOPLE: EXTREMELY DIFFICULT
SUM OF ALL RESPONSES TO PHQ QUESTIONS 1-9: 13

## 2023-11-15 ASSESSMENT — PAIN SCALES - GENERAL: PAINLEVEL: NO PAIN (0)

## 2023-11-15 NOTE — PROGRESS NOTES
"Virtual Visit Details    Type of service:  Video Visit   Video Start Time: {video visit start/end time for provider to select:891078}  Video End Time:{video visit start/end time for provider to select:924057}    Originating Location (pt. Location): {video visit patient location:944470::\"Home\"}  {PROVIDER LOCATION On-site should be selected for visits conducted from your clinic location or adjoining [facility] hospital, academic office, or other nearby [facility] building. Off-site should be selected for all other provider locations, including home:426842}  Distant Location (provider location):  {virtual location provider:884117}  Platform used for Video Visit: {Virtual Visit Platforms:775549::\"Copier How To\"}  "

## 2023-11-15 NOTE — Clinical Note
Please call this patient to get them scheduled for a follow-up visit in 4-6 weeks. Please schedule with me only. Thanks!

## 2023-11-15 NOTE — NURSING NOTE
Is the patient currently in the state of [place]? YES    Visit mode:VIDEO    If the visit is dropped, the patient can be reconnected by: VIDEO VISIT: Send to e-mail at: [email or web address]    Will anyone else be joining the visit? NO  (If patient encounters technical issues they should call [phone] :[phone])    How would you like to obtain your AVS? MyChart    Are changes needed to the allergy or medication list? No    Reason for visit: Consult    Michael TORRES

## 2023-11-15 NOTE — PATIENT INSTRUCTIONS
Treatment Plan:  Discontinue propranolol (already stopped)  Discontinue Seroquel/quetiapine (never taken)  Start olanzapine 2.5-5 mg daily as needed (or 2.5 mg twice daily as needed) for anxiety, racing thoughts, panic, sleep.    Start Lexapro/escitalopram for anxiety and panic: take 5 mg daily for one week then increase to 10 mg daily as tolerated. OK to message me before next appt for possible increased dose (since appt might be a ways out).   Referral placed for intensive day treatment. Someone should call to get you scheduled. Recommend individual psychotherapy for additional support and ongoing development of nonpharmacologic coping skills and strategies.  Continue all other cares per primary care provider.   Continue all other medications as reviewed per electronic medical record today.   Safety plan reviewed. To the Emergency Department as needed or call after hours crisis line at [phone] or [phone]. [place] Crisis Text Line: Text [place] to 954493  or  Suicide LifeLine Chat: suicidepreventionSpectrum Devicesline.org/chat  Schedule an appointment with me in 4-6 weeks or sooner as needed.  Call [facility] at [phone] to schedule.  Follow up with primary care provider as planned or sooner if needed for acute medical concerns.  Call the psychiatric nurse line with medication questions or concerns at [phone].  VisualCVhart may be used to communicate with your provider, but this is not intended to be used for emergencies.    Patient Education:  Get Out of Your Mind & Into Your Life   By Edgard Catalan    The Happiness Trap: How to Stop Struggling and Start Living  By Alfredo Vargas    The Reality Slap: Finding Peace & Fulfillment When Life Hurts  By Alfredo Vargas    Things Might Go Terribly, Horribly Wrong: A Guide to Life Liberated from Anxiety  By Angeli Roach, PhD    Stress Less, Live More: How Acceptance and Commitment Therapy Can Help You Live a Busy Yet Balanced Life  By Edgard Mccarty    Finding  "Life Beyond Trauma: Using Acceptance and Commitment Therapy to Heal From Post-Traumatic Stress and Trauma-Related Problems  By Sonal Solis and Mona Golden    Other ACT Skills References     Alfredo Vargas on YouTube - Demonstrates several different skills to deal with difficult thoughts and feelings     Book:  \"A Liberated Mind: How to Pivot Toward What Matters\" by Edgard Catalan     Psychological flexibility: How love turns pain into purpose  Tedx Talk by Dr. Catalan discussing his struggle with anxiety and panic disorder which motivated him to develop ACT therapy (Acceptance and Commitment Therapy)     You Are Not Your Thoughts     Care team has reviewed attendance agreement with patient. Patient advised that two failed appointments within 6 months may lead to termination of current episode of care.     Community Resources:    National Suicide Prevention Lifeline: Text or call anytime 24/7 for help: 988  (https://BudgetSimple.org/)  National [place] on Mental Illness (www.lucian.org): 355-401-0458 or 465-343-3451.   Mental Health Association (www.mentalhealth.org): 588.787.6736 or 055-835-9024.  [place] Crisis Text Line: Text [place] to 614695  Suicide LifeLine Chat: suicidepreventionlifeline.org/chat  EmPATH (Psychiatric emergency room) at [facility] in [place]    Patient Education   Collaborative Care Psychiatry Service  What to Expect  Here's what to expect from your Collaborative Care Psychiatry Service (CCPS).   About CCPS  CCPS means 2 people work together to help you get better. You'll meet with a behavioral health clinician and a psychiatric doctor. A behavioral health clinician helps people with mental health problems by talking with them. A psychiatric doctor helps people by giving them medicine.  How it works  At every visit, you'll see the behavioral health clinician (BHC) first. They'll talk with you about how you're doing and teach you how to feel better.   Then you'll see the " "psychiatric doctor. This doctor can help you deal with troubling thoughts and feelings by giving you medicine. They'll make sure you know the plan for your care.   CCPS usually takes 3 to 6 visits. If you need more visits, we may have you start seeing a different psychiatric doctor for ongoing care.  If you have any questions or concerns, we'll be glad to talk with you.  About visits  Be open  At your visits, please talk openly about your problems. It may feel hard, but it's the best way for us to help you.  Cancelling visits  If you can't come to your visit, please call us right away at [phone]. If you don't cancel at least 24 hours (1 full day) before your visit, that's \"late cancellation.\"  Being late to visits  Being very late is the same as not showing up. You will be a \"no show\" if:  Your appointment starts with a [facility], and you're more than 15 minutes late for a 30-minute (half hour) visit. This will also cancel your appointment with the psychiatric doctor.  Your appointment is with a psychiatric doctor only, and you're more than 15 minutes late for a 30-minute (half hour) visit.  Your appointment is with a psychiatric doctor only, and you're more than 30 minutes late for a 60-minute (full hour) visit.  If you cancel late or don't show up 2 times within 6 months, we may end your care.   Getting help between visits  If you need help between visits, you can call us Monday to Friday from 8 a.m. to 4:30 p.m. at [phone].  Emergency care  Call 911 or go to the nearest emergency department if your life or someone else's life is in danger.  Call 988 anytime to reach the national Suicide and Crisis hotline.  Medicine refills  To refill your medicine, call your pharmacy. You can also call [facility]'s Behavioral Access at [phone], Monday to Friday, 8 a.m. to 4:30 p.m. It can take 1 to 3 business days to get a refill.   Forms, letters, and tests  You may have papers to fill out, like FMLA, " short-term disability, and workability. We can help you with these forms at your visits, but you must have an appointment. You may need more than 1 visit for this, to be in an intensive therapy program, or both.  Before we can give you medicine for ADHD, we may refer you to get tested for it or confirm it another way.  We may not be able to give you an emotional support animal letter.  We don't do mental health checks ordered by the court.   We don't do mental health testing, but we can refer you to get tested.   Thank you for choosing us for your care.  For informational purposes only. Not to replace the advice of your health care provider. Copyright   2022 [facility]. All rights reserved. [employer] 319680 - 12/22.
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
## 2023-12-08 ENCOUNTER — APPOINTMENT (OUTPATIENT)
Dept: MRI IMAGING | Facility: CLINIC | Age: 33
End: 2023-12-08
Attending: EMERGENCY MEDICINE
Payer: COMMERCIAL

## 2023-12-08 ENCOUNTER — TELEPHONE (OUTPATIENT)
Dept: BEHAVIORAL HEALTH | Facility: CLINIC | Age: 33
End: 2023-12-08
Payer: COMMERCIAL

## 2023-12-08 ENCOUNTER — HOSPITAL ENCOUNTER (EMERGENCY)
Facility: CLINIC | Age: 33
Discharge: HOME | End: 2023-12-08
Admitting: EMERGENCY MEDICINE
Payer: COMMERCIAL

## 2023-12-08 VITALS
BODY MASS INDEX: 35.36 KG/M2 | TEMPERATURE: 98.1 F | HEART RATE: 93 BPM | WEIGHT: 220 LBS | OXYGEN SATURATION: 98 % | DIASTOLIC BLOOD PRESSURE: 84 MMHG | SYSTOLIC BLOOD PRESSURE: 129 MMHG | HEIGHT: 66 IN | RESPIRATION RATE: 18 BRPM

## 2023-12-08 DIAGNOSIS — R20.2 PARESTHESIAS: ICD-10-CM

## 2023-12-08 DIAGNOSIS — F41.9 ANXIETY: ICD-10-CM

## 2023-12-08 LAB
ANION GAP SERPL CALCULATED.3IONS-SCNC: 12 MMOL/L (ref 7–15)
BASOPHILS # BLD AUTO: 0 10E3/UL (ref 0–0.2)
BASOPHILS NFR BLD AUTO: 0 %
BUN SERPL-MCNC: 9.5 MG/DL (ref 6–20)
CALCIUM SERPL-MCNC: 10.2 MG/DL (ref 8.6–10)
CHLORIDE SERPL-SCNC: 102 MMOL/L (ref 98–107)
CREAT SERPL-MCNC: 0.85 MG/DL (ref 0.67–1.17)
DEPRECATED HCO3 PLAS-SCNC: 27 MMOL/L (ref 22–29)
EGFRCR SERPLBLD CKD-EPI 2021: >90 ML/MIN/1.73M2
EOSINOPHIL # BLD AUTO: 0.1 10E3/UL (ref 0–0.7)
EOSINOPHIL NFR BLD AUTO: 1 %
ERYTHROCYTE [DISTWIDTH] IN BLOOD BY AUTOMATED COUNT: 12.1 % (ref 10–15)
GLUCOSE SERPL-MCNC: 101 MG/DL (ref 70–99)
HCT VFR BLD AUTO: 47.3 % (ref 40–53)
HGB BLD-MCNC: 16.6 G/DL (ref 13.3–17.7)
IMM GRANULOCYTES # BLD: 0 10E3/UL
IMM GRANULOCYTES NFR BLD: 0 %
LYMPHOCYTES # BLD AUTO: 2.1 10E3/UL (ref 0.8–5.3)
LYMPHOCYTES NFR BLD AUTO: 25 %
MAGNESIUM SERPL-MCNC: 2.4 MG/DL (ref 1.7–2.3)
MCH RBC QN AUTO: 30.7 PG (ref 26.5–33)
MCHC RBC AUTO-ENTMCNC: 35.1 G/DL (ref 31.5–36.5)
MCV RBC AUTO: 88 FL (ref 78–100)
MONOCYTES # BLD AUTO: 0.4 10E3/UL (ref 0–1.3)
MONOCYTES NFR BLD AUTO: 5 %
NEUTROPHILS # BLD AUTO: 5.6 10E3/UL (ref 1.6–8.3)
NEUTROPHILS NFR BLD AUTO: 69 %
NRBC # BLD AUTO: 0 10E3/UL
NRBC BLD AUTO-RTO: 0 /100
PLATELET # BLD AUTO: 232 10E3/UL (ref 150–450)
POTASSIUM SERPL-SCNC: 3.6 MMOL/L (ref 3.4–5.3)
RBC # BLD AUTO: 5.4 10E6/UL (ref 4.4–5.9)
SODIUM SERPL-SCNC: 141 MMOL/L (ref 135–145)
WBC # BLD AUTO: 8.2 10E3/UL (ref 4–11)

## 2023-12-08 PROCEDURE — 82310 ASSAY OF CALCIUM: CPT | Performed by: EMERGENCY MEDICINE

## 2023-12-08 PROCEDURE — 255N000002 HC RX 255 OP 636: Mod: JZ | Performed by: NURSE PRACTITIONER

## 2023-12-08 PROCEDURE — 250N000013 HC RX MED GY IP 250 OP 250 PS 637: Performed by: EMERGENCY MEDICINE

## 2023-12-08 PROCEDURE — 70553 MRI BRAIN STEM W/O & W/DYE: CPT

## 2023-12-08 PROCEDURE — A9585 GADOBUTROL INJECTION: HCPCS | Mod: JZ | Performed by: NURSE PRACTITIONER

## 2023-12-08 PROCEDURE — 83735 ASSAY OF MAGNESIUM: CPT | Performed by: EMERGENCY MEDICINE

## 2023-12-08 PROCEDURE — 99285 EMERGENCY DEPT VISIT HI MDM: CPT | Mod: 25

## 2023-12-08 PROCEDURE — 85025 COMPLETE CBC W/AUTO DIFF WBC: CPT | Performed by: EMERGENCY MEDICINE

## 2023-12-08 PROCEDURE — 36415 COLL VENOUS BLD VENIPUNCTURE: CPT | Performed by: EMERGENCY MEDICINE

## 2023-12-08 RX ORDER — GADOBUTROL 604.72 MG/ML
9 INJECTION INTRAVENOUS ONCE
Status: COMPLETED | OUTPATIENT
Start: 2023-12-08 | End: 2023-12-08

## 2023-12-08 RX ORDER — DIAZEPAM 5 MG
5 TABLET ORAL ONCE
Status: COMPLETED | OUTPATIENT
Start: 2023-12-08 | End: 2023-12-08

## 2023-12-08 RX ADMIN — DIAZEPAM 5 MG: 5 TABLET ORAL at 19:28

## 2023-12-08 RX ADMIN — GADOBUTROL 9 ML: 604.72 INJECTION INTRAVENOUS at 20:34

## 2023-12-08 ASSESSMENT — ENCOUNTER SYMPTOMS
FACIAL ASYMMETRY: 0
BACK PAIN: 1
VOMITING: 0
NAUSEA: 0
NERVOUS/ANXIOUS: 1
NECK PAIN: 1
WEAKNESS: 0
NUMBNESS: 0
HEADACHES: 1
SHORTNESS OF BREATH: 0

## 2023-12-08 ASSESSMENT — ACTIVITIES OF DAILY LIVING (ADL)
ADLS_ACUITY_SCORE: 35
ADLS_ACUITY_SCORE: 35

## 2023-12-08 NOTE — TELEPHONE ENCOUNTER
Pt is a(n) adult (18+ out of HS) Seeking as eval for Adult Mental Health DA for Programmatic Care - Program Preference? No.  Appointment scheduled by:  Patient.  (self-pay - complete Cost Estimate)    Brief reason for appt:  Referral     needed?  NO    Contact information verified/updated: Yes    Sinai Mckeon

## 2023-12-08 NOTE — ED TRIAGE NOTES
Pt presents with intermittent left sided extremity tingling with tightness on the left side of his head that has been coming and going x 6 days. Denies any history of spinal issues or trauma     Triage Assessment (Adult)       Row Name 12/08/23 1500          Triage Assessment    Airway WDL WDL        Respiratory WDL    Respiratory WDL WDL        Skin Circulation/Temperature WDL    Skin Circulation/Temperature WDL WDL        Cardiac WDL    Cardiac WDL WDL        Peripheral/Neurovascular WDL    Peripheral Neurovascular WDL WDL        Cognitive/Neuro/Behavioral WDL    Cognitive/Neuro/Behavioral WDL WDL

## 2023-12-08 NOTE — ED PROVIDER NOTES
EMERGENCY DEPARTMENT ENCOUNTER      NAME: Arjun Vickers  AGE: 33 year old male  YOB: 1990  MRN: [id number]  EVALUATION DATE & TIME: No admission date for patient encounter.    PCP: Sharan Caputo    ED PROVIDER: Emani Aldrich PA-C      Chief Complaint   Patient presents with    Tingling         FINAL IMPRESSION:  1. Paresthesias    2. Anxiety          ED COURSE & MEDICAL DECISION MAKING:    Pertinent Labs & Imaging studies reviewed. (See chart for details)    33 year old male presents to the Emergency Department for evaluation of paresthesias.    Physical exam is remarkable for a generally well-appearing male who is in no acute distress.  Heart and lung sounds are clear diffusely throughout.  No focal neurologic deficits, cranial nerves III through XII appear grossly intact.  Strength is 5 out of 5 in the upper and lower extremities bilaterally.  No midline spinal tenderness or step-offs.  Vital signs initially remarkable for mild hypertension but otherwise stable and he is afebrile.    CBC is unremarkable with no leukocytosis or anemia.  BMP is unremarkable with no significant electrolyte derangements, normal kidney function. Magnesium very minimally elevated at 2.4, likely of no clinical significance. MRI of the brain with and without contrast was negative.     The patient was given Valium so that he could complete MRI with improvement in his symptoms.  I do not think any further emergent labs or imaging are indicated at this time.  The patient's workup today is overall very reassuring.  He does not have any focal neurologic deficits on exam and his MRI does not reveal any evidence of stroke.  The cause of his paresthesias is not clear at this time, suspect anxiety is playing a role.  The patient is well-connected in the community for his anxiety so I advised him to continue to follow-up with his therapist and psychiatrist.  Advised him to return here for any new or worsening symptoms, the patient  is agreeable with this treatment plan and verbalized understanding.    Medical Decision Making    History:  Supplemental history from: Documented in chart, if applicable  External Record(s) reviewed: Documented in chart, if applicable., Inpatient Record: ED visit from 11/12/2023, and Outpatient Record: PCP visit from 9/6/2023 and psychiatry visit from 11/15/2023    Work Up:  Chart documentation includes differential considered and any EKGs or imaging independently interpreted by provider, where specified.  In additional to work up documented, I considered the following work up: Documented in chart, if applicable.    External consultation:  Discussion of management with another provider: Documented in chart, if applicable    Complicating factors:  Care impacted by chronic illness: Mental Health  Care affected by social determinants of health: N/A    Disposition considerations: Discharge. I recommended the patient continue their current prescription strength medication(s): lexapro. I considered admission, but ultimately discharged patient after reassuring labs and imaging.    ED Course   4:05 PM Performed my initial history and physical exam. Discussed workup in the emergency department, management of symptoms, and likely disposition.   9:18 PM I rechecked on the patient and updated them on results. I discussed the plan for discharge with the patient or family and they are agreeable.. We discussed supportive cares at home and reasons for return to the ER including new or worsening symptoms - all questions and concerns addressed. Patient to be discharged by RN.    At the conclusion of the encounter I discussed the results of all of the tests and the disposition. The questions were answered. The patient or family acknowledged understanding and was agreeable with the care plan.     Voice recognition software was used in the creation of this note. Any grammatical or nonsensical errors are due to inherent errors with the  software and are not the intention of the writer.     MEDICATIONS GIVEN IN THE EMERGENCY:  Medications   diazepam (VALIUM) tablet 5 mg (5 mg Oral $Given 12/8/23 1928)   gadobutrol (GADAVIST) injection 9 mL (9 mLs Intravenous $Given 12/8/23 2034)       NEW PRESCRIPTIONS STARTED AT TODAY'S ER VISIT  New Prescriptions    No medications on file            =================================================================    HPI    Patient information was obtained from: patient     Use of : N/A         Arjun Vickers is a 33 year old male with history of bipolar disorder and colitis who presents to the ED for extremity tingling.     The patient reports he had a panic attack on Friday (~1 week ago). The following day, he had a headache that improved with aspirin. Since Sunday (~5 days ago), the patient has intermittent tingling in his left hand and foot. The tingling in his hand occasionally extends up to his elbow and shoulder while the tingling in his left foot occasional extends into his knee and hip. No associated pain or numbness. The tingling usually improves with movement. In the ED, he currently has some tingling isolated to his left foot. He has experienced tingling in the past with anxiety, and he notes he is typically an anxious person at baseline however previous episodes were bilateral. He also reports new intermittent tension in the left side of his face and forehead. No associated facial tingling or weakness although he notes a subjective drooping sensation when the tension occurs. He has a history of clenching his jaw. The tension sensation has also occurred with anxiety in the past. He is concerned about it now because he is experiencing the tension sensation and extremity tingling despite not feeling anxious. He is concerned about a possible stroke or brain tumor causing his recent symptoms. He also notes that he tends to hunch over due to anxiety, and he wonders if his position is contributing to  his recent symptoms. He does have some pain and stiffness in his neck and back which he attributes to his positioning. Denies any recent chiropractic care or trauma.     He also reports he had chest tightness a few weeks ago; he was seen for that concern and diagnosed with costochondritis. The chest tightness was improving until it started worsening again following his recent panic attack on Friday (~1 week ago). He also reports black floaters in his vision which started about 5 months ago, and have been intermittent since onset. He denies any other vision changes recently. Denies any nausea, vomiting, chest pain, shortness of breath, or any other complaints at this time. The patient takes Lexapro for his anxiety and has regular therapy appointments. Denies any history of blood clots, hypertension, or diabetes. Denies any family history of strokes. He does not smoke or use any drugs.      Per chart review,   9/6/2023 Most recent primary care virtual visit during which time bipolar disorder was addressed and Seroquel was prescribed. Also addressed anxiety and panic, propanolol was initiated and referral to behavioral health was made.  11/12/2023 The patient was seen in the ED for chest pain for several months. Labs including BMP,  troponin, Mg, TSH, and CBC were reassuring. CXR without acute findings. ECG normal. Symptoms attributed to costochondritis, and he was discharged to home in stable condition.   11/15/2023 Seen by psychiatry for anxiety concerns. He was started on Lexapro and prn olanzapine. Recommended to avoid benzodiazepines in his case given history of alcohol use struggles/DUI. Recommended individual psychotherapy, and referral was placed for intensive day treatment. Discontinued propranolol and Seroquel/quetiapine      REVIEW OF SYSTEMS   Review of Systems   Eyes:  Negative for visual disturbance.   Respiratory:  Negative for shortness of breath.    Cardiovascular:  Negative for chest pain.  "  Gastrointestinal:  Negative for nausea and vomiting.   Musculoskeletal:  Positive for back pain and neck pain.   Neurological:  Positive for headaches (left sided tension discomfort). Negative for facial asymmetry, weakness and numbness.        Positive for tingling in left upper/lower extremity.    Psychiatric/Behavioral:  The patient is nervous/anxious.      All other systems reviewed and are negative unless noted in HPI.      PAST MEDICAL HISTORY:  Past Medical History:   Diagnosis Date    Colitis     Suicidal ideations        PAST SURGICAL HISTORY:  Past Surgical History:   Procedure Laterality Date    OTHER SURGICAL HISTORY      none       CURRENT MEDICATIONS:    escitalopram (LEXAPRO) 10 MG tablet  OLANZapine (ZYPREXA) 5 MG tablet        ALLERGIES:  Allergies   Allergen Reactions    Lithium Rash       FAMILY HISTORY:  Family History   Problem Relation Age of Onset    Bipolar Disorder Mother     Clotting Disorder Mother     No Known Problems Brother        SOCIAL HISTORY:   Social History     Socioeconomic History    Marital status: Single    Number of children: 0    Years of education: 12   Tobacco Use    Smoking status: Never     Passive exposure: Yes    Smokeless tobacco: Never   Substance and Sexual Activity    Alcohol use: Yes     Alcohol/week: 1.0 standard drink of alcohol     Comment: Alcoholic Drinks/day: occasionally binge    Drug use: Not Currently     Types: Marijuana    Sexual activity: Yes     Partners: Male       VITALS:  Patient Vitals for the past 24 hrs:   BP Temp Temp src Pulse Resp SpO2 Height Weight   12/08/23 2001 129/84 -- -- -- -- -- -- --   12/08/23 2000 -- -- -- 93 -- 98 % -- --   12/08/23 1509 (!) 144/88 98.1  F (36.7  C) Oral 96 18 98 % 1.676 m (5' 6\") 99.8 kg (220 lb)       PHYSICAL EXAM    VITAL SIGNS: /84   Pulse 93   Temp 98.1  F (36.7  C) (Oral)   Resp 18   Ht 1.676 m (5' 6\")   Wt 99.8 kg (220 lb)   SpO2 98%   BMI 35.51 kg/m    General Appearance: Alert, " cooperative, normal speech and facial symmetry, appears stated age, the patient does not appear in distress  Head:  Normocephalic, without obvious abnormality, atraumatic  Eyes: Conjunctiva/corneas clear, EOM's intact, no nystagmus, PERRL  ENT:  Lips, mucosa, and tongue normal; teeth and gums normal, no pharyngeal inflammation, no dysphonia or difficulty swallowing, membranes are moist without pallor  Cardio:  Regular rate and rhythm, S1 and S2 normal, no murmur, rub    or gallop, 2+ pulses symmetric in all extremities  Pulm:  Clear to auscultation bilaterally, respirations unlabored with no accessory muscle use  Back: No midline tenderness or step-offs, no CVA tenderness  Extremities:  Extremities normal, there is no tenderness to palpation, atraumatic, no cyanosis or edema, full function and range of motion, pulses equal in all extremities, normal cap refill, no joint swelling; strength is 5/5 in the upper and lower extremities bilaterally  Skin: Skin is warm and dry, no rashes or wounds  Neuro: Patient is awake, alert, and responsive to voice. No gross motor weaknesses or sensory loss; moves all extremities. Cranial Nerves:  CN2: No funduscopic exam performed. CN3,4 & 6: Pupillary light response, lateral and vertical gaze normal.  No nystagmus.  CN7: No facial weakness, smile, facial symmetry intact. CN8: Intact to spoken voice. CN9&10: Gag reflex, uvula midline, palate rises with phonation. CN11: Shoulder shrug 5/5 intact bilaterally. CN12: Tongue midline and moves freely from side to side.      LAB:  All pertinent labs reviewed and interpreted.  Labs Ordered and Resulted from Time of ED Arrival to Time of ED Departure   BASIC METABOLIC PANEL - Abnormal       Result Value    Sodium 141      Potassium 3.6      Chloride 102      Carbon Dioxide (CO2) 27      Anion Gap 12      Urea Nitrogen 9.5      Creatinine 0.85      GFR Estimate >90      Calcium 10.2 (*)     Glucose 101 (*)    MAGNESIUM - Abnormal    Magnesium  2.4 (*)    CBC WITH PLATELETS AND DIFFERENTIAL    WBC Count 8.2      RBC Count 5.40      Hemoglobin 16.6      Hematocrit 47.3      MCV 88      MCH 30.7      MCHC 35.1      RDW 12.1      Platelet Count 232      % Neutrophils 69      % Lymphocytes 25      % Monocytes 5      % Eosinophils 1      % Basophils 0      % Immature Granulocytes 0      NRBCs per 100 WBC 0      Absolute Neutrophils 5.6      Absolute Lymphocytes 2.1      Absolute Monocytes 0.4      Absolute Eosinophils 0.1      Absolute Basophils 0.0      Absolute Immature Granulocytes 0.0      Absolute NRBCs 0.0         RADIOLOGY:  Reviewed all pertinent imaging. Please see official radiology report.  MR Brain w/o & w Contrast   Final Result   IMPRESSION:   1.  Normal head MRI.            I, Louann Good, am serving as a scribe to document services personally performed by Emani Aldrich PA-C based on my observation and the provider's statements to me. IEmani PA-C attest that Louann Good is acting in a scribe capacity, has observed my performance of the services and has documented them in accordance with my direction.     Emani Aldrich PA-C  Emergency Medicine  [facility] EMERGENCY ROOM  [address] [facility] [address] [facility] [id number] [phone]  Dept: [phone]       Emani Aldrich PA-C  12/08/23 9998

## 2023-12-09 NOTE — ED NOTES
"Patient verbalizes feeling less anxious now after the anxiolytic medication. Reports tingling has subsided after the medication. Reports he is prescribed to take Lexapro for anxiety however he \"googles\" the side effects and does not want to take the medication anymore. Reports anxiety has gotten worse and has a follow up appointment with doctor that prescribed the anxiety medication.   "

## 2023-12-09 NOTE — DISCHARGE INSTRUCTIONS
You were seen here today for evaluation of tingling.  Your lab work today is reassuring with no evidence of infection, electrolyte problems, or kidney dysfunction.  Your MRI was normal with no evidence of strokes or masses.    The cause of your symptoms is not clear at this time however I do not think it represents any life-threatening emergency.  Continue to monitor your symptoms, specifically what makes them better or worse as they could be positional as we discussed.    You may take Tylenol and ibuprofen for pain/fever, do not exceed 4000 mg of Tylenol per day or 3200 mg ofibuprofen per day.    Follow-up with your primary care provider for recheck and return here for any new or worsening symptoms including slurred speech, facial droop, weakness on one side of your body, confusion, chest pain, difficulty breathing, or any other symptoms that concern you.

## 2023-12-12 ENCOUNTER — TELEPHONE (OUTPATIENT)
Dept: BEHAVIORAL HEALTH | Facility: CLINIC | Age: 33
End: 2023-12-12
Payer: COMMERCIAL

## 2023-12-12 ENCOUNTER — VIRTUAL VISIT (OUTPATIENT)
Dept: BEHAVIORAL HEALTH | Facility: CLINIC | Age: 33
End: 2023-12-12
Payer: COMMERCIAL

## 2023-12-12 DIAGNOSIS — F41.0 PANIC ATTACK: ICD-10-CM

## 2023-12-12 DIAGNOSIS — R45.89 ANXIETY ABOUT HEALTH: Primary | ICD-10-CM

## 2023-12-12 ASSESSMENT — COLUMBIA-SUICIDE SEVERITY RATING SCALE - C-SSRS
2. HAVE YOU ACTUALLY HAD ANY THOUGHTS OF KILLING YOURSELF?: NO
1. SINCE LAST CONTACT, HAVE YOU WISHED YOU WERE DEAD OR WISHED YOU COULD GO TO SLEEP AND NOT WAKE UP?: NO

## 2023-12-12 NOTE — TELEPHONE ENCOUNTER
Writer spoke with patient and scheduled long term appointment     Date: Monday, 12/18/2023  Time: 2:00 pm - 2:55 pm  Provider: Angeli Cobian MA  [facility]  Location: [facility], [address] [place] [address]  Phone: [phone]  Type: Teletherapy    Carol Mcmillan  12/12/2023  440

## 2023-12-12 NOTE — TELEPHONE ENCOUNTER
----- Message from RENETTA Soliz sent at 12/12/2023  3:26 PM CST -----  Regarding: care connect  Transition Clinic Next Level of Care Referral Request    MRN: [id number]  Patient Name:  Arjun Vickers  Phone:  [phone] (home)   E-mail Address: [email or web address]    Type of patient appointment needed: Individual therapy    Provider Specialties: Specialty Services Requested: Anxiety Disorder/panic    Service Modality:  Service Modality: Virtual    Clinician Gender Preference: male    Clinical Comments: Patient Google's everything increasing anxiety.    Stop medication due sot his fear of med's.  Instructed not to Google.      RENETTA Soares

## 2023-12-19 ENCOUNTER — DOCUMENTATION ONLY (OUTPATIENT)
Dept: BEHAVIORAL HEALTH | Facility: CLINIC | Age: 33
End: 2023-12-19
Payer: COMMERCIAL
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
## 2023-12-24 ASSESSMENT — ENCOUNTER SYMPTOMS
NAUSEA: 1
JOINT SWELLING: 0
WEAKNESS: 0
FEVER: 0
ABDOMINAL PAIN: 0
COUGH: 0
NERVOUS/ANXIOUS: 1
FREQUENCY: 0
PARESTHESIAS: 0
DIARRHEA: 1
MYALGIAS: 1
HEMATOCHEZIA: 0
CONSTIPATION: 0
ARTHRALGIAS: 0
HEARTBURN: 1
SORE THROAT: 0
DIZZINESS: 1
HEADACHES: 1
DYSURIA: 0
CHILLS: 0
EYE PAIN: 1
PALPITATIONS: 0
HEMATURIA: 0

## 2023-12-27 ENCOUNTER — VIRTUAL VISIT (OUTPATIENT)
Dept: PSYCHIATRY | Facility: CLINIC | Age: 33
End: 2023-12-27
Payer: COMMERCIAL

## 2023-12-27 DIAGNOSIS — F41.0 PANIC ATTACK: ICD-10-CM

## 2023-12-27 DIAGNOSIS — F41.1 GENERALIZED ANXIETY DISORDER: Primary | ICD-10-CM

## 2023-12-27 DIAGNOSIS — Z86.59 HX OF BIPOLAR DISORDER: ICD-10-CM

## 2023-12-27 DIAGNOSIS — F39 MOOD DISORDER (H): ICD-10-CM

## 2023-12-27 PROCEDURE — 99214 OFFICE O/P EST MOD 30 MIN: CPT | Mod: 95 | Performed by: PSYCHIATRY & NEUROLOGY

## 2023-12-27 RX ORDER — LAMOTRIGINE 25 MG/1
TABLET ORAL
Qty: 42 TABLET | Refills: 0 | Status: SHIPPED | OUTPATIENT
Start: 2023-12-27 | End: 2024-08-02

## 2023-12-27 RX ORDER — LAMOTRIGINE 100 MG/1
TABLET ORAL
Qty: 60 TABLET | Refills: 1 | Status: SHIPPED | OUTPATIENT
Start: 2024-01-22 | End: 2024-08-02

## 2023-12-27 RX ORDER — DIAZEPAM 5 MG
5 TABLET ORAL DAILY PRN
Qty: 5 TABLET | Refills: 0 | Status: SHIPPED | OUTPATIENT
Start: 2023-12-27

## 2023-12-27 RX ORDER — OLANZAPINE 10 MG/1
5-10 TABLET ORAL
Qty: 90 TABLET | Refills: 0 | Status: SHIPPED | OUTPATIENT
Start: 2023-12-27 | End: 2024-08-02

## 2023-12-27 NOTE — PROGRESS NOTES
"Telemedicine Visit: The patient's condition can be safely assessed and treated via synchronous audio and visual telemedicine encounter.      Reason for Telemedicine Visit: Patient has requested telehealth visit    Originating Site (Patient Location): Patient's home    Distant Location (provider location):  Off-site    Consent:  The patient/guardian has verbally consented to: the potential risks and benefits of telemedicine (video visit) versus in person care; bill my insurance or make self-payment for services provided; and responsibility for payment of non-covered services.     Mode of Communication:  Video Conference via [employer]    As the provider I attest to compliance with applicable laws and regulations related to telemedicine.         Outpatient Psychiatric Progress Note    Name: Arjun PATRICIA Vickers   : 1990                    Primary Care Provider: Sharan Caputo NP   Therapist: None     PHQ-9 scores:      2023     5:55 PM 11/15/2023     3:13 PM 2023     3:32 PM   PHQ-9 SCORE   PHQ-9 Total Score MyChart  13 (Moderate depression) 11 (Moderate depression)   PHQ-9 Total Score 1 13 11       TOM-7 scores:      2023     5:55 PM 2023     1:55 PM 2023     3:31 PM   TOM-7 SCORE   Total Score  20 (severe anxiety) 16 (severe anxiety)   Total Score 13 20 16       Patient Identification:  Patient is a 33 year old, partnered / significant other  White Not  or  male  who presents for return visit with me.  Patient is currently unemployed. Patient attended the phone/video session alone. Patient prefers to be called: \"Arjun\".    Interim History:  I last saw Arjun Vickers for outpatient psychiatry Consultation on 11/15/2023. During that appointment, we:    Discontinue propranolol (already stopped)  Discontinue Seroquel/quetiapine (never taken)  Start olanzapine 2.5-5 mg daily as needed (or 2.5 mg twice daily as needed) for anxiety, racing thoughts, panic, sleep.    Start " "Lexapro/escitalopram for anxiety and panic: take 5 mg daily for one week then increase to 10 mg daily as tolerated. OK to message me before next appt for possible increased dose (since appt might be a ways out).   Referral placed for intensive day treatment. Someone should call to get you scheduled. Recommend individual psychotherapy for additional support and ongoing development of nonpharmacologic coping skills and strategies.  Continue all other cares per primary care provider.       12/27:  Was taking the Lexapro and having pretty crazy side effects. Was awake for maybe 40 hours, increased panic attacks, and worsened anxiety. Developed a tingle, only on left side.  Patient was then seen in the emergency room for his symptoms.  Negative medical workup in the emergency room.  Stopped taking the medication and symptoms went away. Didn't notice a difference with olanzapine. Took one olanzapine 2-3 times.  No major negative side effects noted from olanzapine.  Has an appointment with primary care provider on Friday to further evaluate \"thump\" he feels in his chest every now and again.  Reports this started after he stopped taking any medication.  Reports that started a couple weeks ago.  No other major negative side effects or symptoms patient is experiencing.  No acute safety concerns.  No SI.  No problematic drug or alcohol use.  Remains sober from alcohol.  Patient did report starting with an individual psychotherapist on 12/18/2023.  Had a good visit and will be meeting with this therapist every Monday at 2 PM.    Per recent ER visit 12/8/23:  Arjun Vickers is a 33 year old male with history of bipolar disorder and colitis who presents to the ED for extremity tingling.      The patient reports he had a panic attack on Friday (~1 week ago). The following day, he had a headache that improved with aspirin. Since Sunday (~5 days ago), the patient has intermittent tingling in his left hand and foot. The tingling in his " hand occasionally extends up to his elbow and shoulder while the tingling in his left foot occasional extends into his knee and hip. No associated pain or numbness. The tingling usually improves with movement. In the ED, he currently has some tingling isolated to his left foot. He has experienced tingling in the past with anxiety, and he notes he is typically an anxious person at baseline however previous episodes were bilateral. He also reports new intermittent tension in the left side of his face and forehead. No associated facial tingling or weakness although he notes a subjective drooping sensation when the tension occurs. He has a history of clenching his jaw. The tension sensation has also occurred with anxiety in the past. He is concerned about it now because he is experiencing the tension sensation and extremity tingling despite not feeling anxious. He is concerned about a possible stroke or brain tumor causing his recent symptoms. He also notes that he tends to hunch over due to anxiety, and he wonders if his position is contributing to his recent symptoms. He does have some pain and stiffness in his neck and back which he attributes to his positioning. Denies any recent chiropractic care or trauma.      He also reports he had chest tightness a few weeks ago; he was seen for that concern and diagnosed with costochondritis. The chest tightness was improving until it started worsening again following his recent panic attack on Friday (~1 week ago). He also reports black floaters in his vision which started about 5 months ago, and have been intermittent since onset. He denies any other vision changes recently. Denies any nausea, vomiting, chest pain, shortness of breath, or any other complaints at this time. The patient takes Lexapro for his anxiety and has regular therapy appointments. Denies any history of blood clots, hypertension, or diabetes. Denies any family history of strokes. He does not smoke or use  "any drugs.      Past Psychiatric Med Trials:  Psych Meds at Intake:  None      Past Psych Meds:  Ativan - for like a month or two in mid-20s  Lithium - In early 20s but really bad rash  Propranolol - rare use up to 20 mg - feels like \"hit me really hard\" and heart rate was in 50s  Seroquel - has not taken  Latuda - in past  Hydroxyzine - in past 50 mg up to 50 mg daily   Lexapro - insomnia, worsening anxiety, panic attacks    Psychiatric ROS:  Arjun Vickers reports mood has been: About the same  Anxiety has been: About the same  Sleep has been: About the same  Elana sxs: None  Psychosis sxs: None  ADHD/ADD sxs: NA  PTSD sxs: NA  PHQ9 and GAD7 scores were reviewed today if completed.   Medication side effects:  See HPI above  Current stressors include: And see HPI above  Coping mechanisms and supports include: Therapy, Family, Hobbies, and Friends    Current medications include:   Current Outpatient Medications   Medication Sig    escitalopram (LEXAPRO) 10 MG tablet Take 1 tablet (10 mg) by mouth daily Start with 5 mg for one week.    OLANZapine (ZYPREXA) 5 MG tablet Take 1 tablet (5 mg) by mouth daily as needed (racing thoughts, anxiety, sleep)     No current facility-administered medications for this visit.       The [place] Prescription Monitoring Program has been reviewed and there are no concerns about diversionary activity for controlled substances at this time.     Past Medical/Surgical History:  Past Medical History:   Diagnosis Date    Colitis     Suicidal ideations       has a past medical history of Colitis and Suicidal ideations.    Social History:  Reviewed. No changes to social history except as noted above in HPI.    Vital Signs:   None. This is phone/video visit.     Labs:  Most recent laboratory results reviewed and the pertinent results include:   Lab Results   Component Value Date    WBC 8.2 12/08/2023     Lab Results   Component Value Date    RBC 5.40 12/08/2023     Lab Results   Component Value " "Date    HGB 16.6 12/08/2023     Lab Results   Component Value Date    HCT 47.3 12/08/2023     No components found for: \"MCT\"  Lab Results   Component Value Date    MCV 88 12/08/2023     Lab Results   Component Value Date    MCH 30.7 12/08/2023     Lab Results   Component Value Date    MCHC 35.1 12/08/2023     Lab Results   Component Value Date    RDW 12.1 12/08/2023     Lab Results   Component Value Date     12/08/2023     Last Comprehensive Metabolic Panel:  Lab Results   Component Value Date     12/08/2023    POTASSIUM 3.6 12/08/2023    CHLORIDE 102 12/08/2023    CO2 27 12/08/2023    ANIONGAP 12 12/08/2023     (H) 12/08/2023    BUN 9.5 12/08/2023    CR 0.85 12/08/2023    GFRESTIMATED >90 12/08/2023    YARI 10.2 (H) 12/08/2023      Magnesium 2.4 on 12/8/23       Review of Systems:  10 systems (general, cardiovascular, respiratory, eyes, ENT, endocrine, GI, , M/S, neurological) were reviewed. Most pertinent finding(s) is/are: .\"Thump\" in his chest every now and again- has appt for Friday with pcp for this.  The remaining systems are all unremarkable.    Mental Status Examination (limited as this is by phone/video):  Appearance: Awake, alert, appears stated age, no acute distress, well-groomed   Attitude:  cooperative, pleasant   Motor: No gross abnormalities observed via video, not formally tested   Oriented to:  person, place, time, and situation  Attention Span and Concentration:  normal  Speech:  clear, coherent, regular rate, rhythm, and volume  Language: intact  Mood:  anxious  Affect:  appropriate and in normal range  Associations:  no loose associations  Thought Process:  logical, linear and goal oriented  Thought Content:  no evidence of suicidal ideation or homicidal ideation, no evidence of psychotic thought, no auditory hallucinations present and no visual hallucinations present  Recent and Remote Memory:  Intact to interview. Not formally assessed. No amnesia.  Fund of Knowledge: " appropriate  Insight:  fair  Judgment:  intact, adequate for safety  Impulse Control:  intact    Suicide Risk Assessment:  Today Arjun Vickers reports no suicidal ideation. Based on all available evidence including the factors cited above, Arjun Vickers does not appear to be at imminent risk for self-harm, does not meet criteria for a 72-hr hold, and therefore remains appropriate for ongoing outpatient level of care.  A thorough assessment of risk factors related to suicide and self-harm have been reviewed and are noted above. The patient convincingly denies suicidality on several occasions. Local community safety resources reviewed for patient to use if needed. There was no deceit detected, and the patient presented in a manner that was believable.     DSM5 Diagnosis:  300.02 (F41.1) Generalized Anxiety Disorder  Panic Attacks  Mood Disorder, Unspecified  Hx of Bipolar Disorder Dx    Medical comorbidities include:   Patient Active Problem List    Diagnosis Date Noted    Bipolar Disorder      Priority: Medium     Created by Conversion  Replacement Utility updated for latest IMO load           Psychosocial & Contextual Factors: see HPI above    Assessment:  From Intake, 11/15/2023:  Arjun Vickers is a 33-year-old male with past psychiatric history including anxiety, bipolar disorder, and panic attacks who presents today for psychiatric evaluation.  Trials with in efficacy or poor tolerability.  Unclear if bipolar disorder diagnosis is accurate since made quite a long time ago and unclear if patient has had true manic episodes.  Possibility of mixed episodes?  Given this, I do recommend patient start trial with SSRI and can take olanzapine as needed for anxiety, racing thoughts, panic, and sleep.  Encouraged to watch for hypomanic/manic symptoms or any worsening anxiety and insomnia.  Discussed I would like to avoid benzodiazepines in his case given history of alcohol use struggles/DUI.  Also discussed would like to avoid  benzodiazepines since his anxiety and panic symptoms are daily and not infrequent.  Daily coverage with a safer side effects profile, and less dependence risks, would be recommended.  Strongly recommended individual psychotherapy.  Referral was placed for intensive day treatment for higher level of care due to patient's incredibly poor functioning at this time.  No acute safety concerns.  No SI.  Patient denies any current drug or alcohol abuse/concerns.  Patient reports has stopped drinking almost the past month     12/27/2023:  Patient did not tolerate Lexapro-very activating and caused insomnia, panic attacks, and worsening anxiety.  Patient discontinued this medication.  Only tried olanzapine a couple times and did not notice much but tolerated well.  Was seen in the emergency room after poor reaction to Lexapro 12/8/2023.  Took Valium 5 mg dose for MRI and felt it was helpful to relax him.  Patient is agreeable to starting a trial with lamotrigine for mood disorder and suspected/history of bipolar diagnosis.  Also encouraged to continue olanzapine but at 5-10 mg at bedtime as needed.  Patient will be given 5 tablets of 5 mg Valium for panic attacks only.  5 tablets to last 30 days.    Medication side effects and alternatives were reviewed. Health promotion activities recommended and reviewed today. All questions addressed. Education and counseling completed regarding risks and benefits of medications and psychotherapy options. Recommend therapy for additional support.     Treatment Plan:  Increase olanzapine to 5-10 mg daily as needed for anxiety, racing thoughts, panic, sleep, suspected bipolar disorder.    Start Lamotrigine for mood and anxiety symptoms: take 25 mg daily for two weeks then increase to 50 mg daily for two weeks then to 100 mg daily for two weeks then 150 mg daily for two weeks then 200 mg daily  Start Valium/diazepam 5 mg daily as needed for panic attacks only. 5 tabs to last 30 days.    Continue all other cares per primary care provider.   Continue all other cares per primary care provider.   Continue all other medications as reviewed per electronic medical record today.   Safety plan reviewed. To the Emergency Department as needed or call after hours crisis line at [phone] or [phone]. [place] Crisis Text Line. Text [place] to 901853 or Suicide LifeLine Chat: suicidepreventionMizhe.comline.org/chat  Continue therapy as planned.   Schedule an appointment with me in 2 months or sooner as needed. Call [facility] at [phone] to schedule.  Follow up with primary care provider as planned or for acute medical concerns.  Call the psychiatric nurse line with medication questions or concerns at [phone].  MyChart may be used to communicate with your provider, but this is not intended to be used for emergencies.    Discussed Lamictal (lamotrigine) can cause serious rashes including Carl-Kike syndrome which may requiring hospitalization and discontinuation of treatment. If any signs of a rash occur, please see your Primary Care Provider or a dermatologist immediately.     Risks of benzodiazepine (Ativan, Xanax, Klonopin, Valium, etc) use including, but not limited to, sedation, tolerance, risk for addiction/dependence. Do not drink alcohol while taking benzodiazepines due to risk of trouble breathing and potential death. Do not drive or operate heavy machinery until it is known how the drug affects you. Discuss with physician or pharmacist before ever taking a benzodiazepine with a narcotic/opioid pain medication.     Administrative Billing:   Phone Call/Video Duration: 18 Minutes  Start: 1:33p  Stop: 1:51p    Episode of Care #: 2    Patient Status:  Patient will continue to be seen for ongoing consultation and stabilization.    Signed:   Dari Martins DO  [facility]    Disclaimer: This note consists of symbols derived from keyboarding, dictation and/or voice recognition  software. As a result, there may be errors in the script that have gone undetected. Please consider this when interpreting information found in this chart.

## 2023-12-27 NOTE — NURSING NOTE
Is the patient currently in the state of [place]? YES    Visit mode:VIDEO    If the visit is dropped, the patient can be reconnected by: VIDEO VISIT:  Send e-mail to at [email or web address]    Will anyone else be joining the visit? No  (If patient encounters technical issues they should call [phone])    How would you like to obtain your AVS? MyChart    Are changes needed to the allergy or medication list? Yes Pt reported no longer taking medications: escitalopram 10 mg and olanzapine 5 mg; and Pt declined allergy review    Rooming Documentation: Attendance Guidelines - Care team has reviewed attendance agreement with patient. Patient advised that two failed appointments within 6 months may lead to termination of current episode of care.       Reason for visit: DUKE Jack, JEFFREYF

## 2023-12-27 NOTE — PROGRESS NOTES
"Virtual Visit Details    Type of service:  Video Visit   Video Start Time: {video visit start/end time for provider to select:683252}  Video End Time:{video visit start/end time for provider to select:948484}    Originating Location (pt. Location): {video visit patient location:408166::\"Home\"}  {PROVIDER LOCATION On-site should be selected for visits conducted from your clinic location or adjoining [facility] hospital, academic office, or other nearby [facility] building. Off-site should be selected for all other provider locations, including home:773740}  Distant Location (provider location):  {virtual location provider:242229}  Platform used for Video Visit: {Virtual Visit Platforms:548316::\"Nadanu\"}          "

## 2023-12-27 NOTE — PATIENT INSTRUCTIONS
Treatment Plan:  Increase olanzapine to 5-10 mg daily as needed for anxiety, racing thoughts, panic, sleep, suspected bipolar disorder.    Start Lamotrigine for mood and anxiety symptoms: take 25 mg daily for two weeks then increase to 50 mg daily for two weeks then to 100 mg daily for two weeks then 150 mg daily for two weeks then 200 mg daily  Start Valium/diazepam 5 mg daily as needed for panic attacks only. 5 tabs to last 30 days.   Continue all other cares per primary care provider.   Continue all other cares per primary care provider.   Continue all other medications as reviewed per electronic medical record today.   Safety plan reviewed. To the Emergency Department as needed or call after hours crisis line at [phone] or [phone]. [place] Crisis Text Line. Text [place] to 776156 or Suicide LifeLine Chat: suicidepreventionlifeline.org/chat  Continue therapy as planned.   Schedule an appointment with me in 2 months or sooner as needed. Call [facility] at [phone] to schedule.  Follow up with primary care provider as planned or for acute medical concerns.  Call the psychiatric nurse line with medication questions or concerns at [phone].  NX[employer]ION[employer] may be used to communicate with your provider, but this is not intended to be used for emergencies.    Discussed Lamictal (lamotrigine) can cause serious rashes including Carl-Kike syndrome which may requiring hospitalization and discontinuation of treatment. If any signs of a rash occur, please see your Primary Care Provider or a dermatologist immediately.     Risks of benzodiazepine (Ativan, Xanax, Klonopin, Valium, etc) use including, but not limited to, sedation, tolerance, risk for addiction/dependence. Do not drink alcohol while taking benzodiazepines due to risk of trouble breathing and potential death. Do not drive or operate heavy machinery until it is known how the drug affects you. Discuss with physician or pharmacist  "before ever taking a benzodiazepine with a narcotic/opioid pain medication.     Patient Education   Collaborative Care Psychiatry Service  What to Expect  Here's what to expect from your Collaborative Care Psychiatry Service (CCPS).   About CCPS  CCPS means 2 people work together to help you get better. You'll meet with a behavioral health clinician and a psychiatric doctor. A behavioral health clinician helps people with mental health problems by talking with them. A psychiatric doctor helps people by giving them medicine.  How it works  At every visit, you'll see the behavioral health clinician (BHC) first. They'll talk with you about how you're doing and teach you how to feel better.   Then you'll see the psychiatric doctor. This doctor can help you deal with troubling thoughts and feelings by giving you medicine. They'll make sure you know the plan for your care.   CCPS usually takes 3 to 6 visits. If you need more visits, we may have you start seeing a different psychiatric doctor for ongoing care.  If you have any questions or concerns, we'll be glad to talk with you.  About visits  Be open  At your visits, please talk openly about your problems. It may feel hard, but it's the best way for us to help you.  Cancelling visits  If you can't come to your visit, please call us right away at [phone]. If you don't cancel at least 24 hours (1 full day) before your visit, that's \"late cancellation.\"  Being late to visits  Being very late is the same as not showing up. You will be a \"no show\" if:  Your appointment starts with a BHC, and you're more than 15 minutes late for a 30-minute (half hour) visit. This will also cancel your appointment with the psychiatric doctor.  Your appointment is with a psychiatric doctor only, and you're more than 15 minutes late for a 30-minute (half hour) visit.  Your appointment is with a psychiatric doctor only, and you're more than 30 minutes late for a 60-minute (full hour) " visit.  If you cancel late or don't show up 2 times within 6 months, we may end your care.   Getting help between visits  If you need help between visits, you can call us Monday to Friday from 8 a.m. to 4:30 p.m. at [phone].  Emergency care  Call 911 or go to the nearest emergency department if your life or someone else's life is in danger.  Call 988 anytime to reach the national Suicide and Crisis hotline.  Medicine refills  To refill your medicine, call your pharmacy. You can also call [facility]'s [facility] at [phone], Monday to Friday, 8 a.m. to 4:30 p.m. It can take 1 to 3 business days to get a refill.   Forms, letters, and tests  You may have papers to fill out, like FMLA, short-term disability, and workability. We can help you with these forms at your visits, but you must have an appointment. You may need more than 1 visit for this, to be in an intensive therapy program, or both.  Before we can give you medicine for ADHD, we may refer you to get tested for it or confirm it another way.  We may not be able to give you an emotional support animal letter.  We don't do mental health checks ordered by the court.   We don't do mental health testing, but we can refer you to get tested.   Thank you for choosing us for your care.  For informational purposes only. Not to replace the advice of your health care provider. Copyright   2022 [facility]. All rights reserved. [employer] [id number] - 12/22.        Assistance OOB with selected safe patient handling equipment/Assistance with ambulation/Communicate Fall Risk and Risk Factors to all staff, patient, and family/Discuss with provider need for PT consult/Monitor gait and stability/Reinforce activity limits and safety measures with patient and family/Visual Cue: Yellow wristband/Bed in lowest position, wheels locked, appropriate side rails in place/Call bell, personal items and telephone in reach/Instruct patient to call for assistance before getting out of bed or chair/Non-slip footwear when patient is out of bed/[place] to call system/Physically safe environment - no spills, clutter or unnecessary equipment/Purposeful Proactive Rounding/Room/bathroom lighting operational, light cord in reach

## 2023-12-29 ENCOUNTER — OFFICE VISIT (OUTPATIENT)
Dept: FAMILY MEDICINE | Facility: CLINIC | Age: 33
End: 2023-12-29
Attending: STUDENT IN AN ORGANIZED HEALTH CARE EDUCATION/TRAINING PROGRAM
Payer: COMMERCIAL

## 2023-12-29 VITALS
WEIGHT: 204.2 LBS | SYSTOLIC BLOOD PRESSURE: 120 MMHG | RESPIRATION RATE: 16 BRPM | BODY MASS INDEX: 30.95 KG/M2 | HEART RATE: 89 BPM | DIASTOLIC BLOOD PRESSURE: 80 MMHG | OXYGEN SATURATION: 96 % | TEMPERATURE: 97.9 F | HEIGHT: 68 IN

## 2023-12-29 DIAGNOSIS — F41.1 GENERALIZED ANXIETY DISORDER: ICD-10-CM

## 2023-12-29 DIAGNOSIS — Z11.59 NEED FOR HEPATITIS C SCREENING TEST: ICD-10-CM

## 2023-12-29 DIAGNOSIS — F41.0 PANIC ATTACK: ICD-10-CM

## 2023-12-29 DIAGNOSIS — F30.9 BIPOLAR I DISORDER, SINGLE MANIC EPISODE (H): ICD-10-CM

## 2023-12-29 DIAGNOSIS — Z13.220 LIPID SCREENING: ICD-10-CM

## 2023-12-29 DIAGNOSIS — Z00.00 ROUTINE GENERAL MEDICAL EXAMINATION AT A HEALTH CARE FACILITY: Primary | ICD-10-CM

## 2023-12-29 PROCEDURE — 99395 PREV VISIT EST AGE 18-39: CPT | Performed by: NURSE PRACTITIONER

## 2023-12-29 PROCEDURE — 99214 OFFICE O/P EST MOD 30 MIN: CPT | Mod: 25 | Performed by: NURSE PRACTITIONER

## 2023-12-29 RX ORDER — EMTRICITABINE AND TENOFOVIR DISOPROXIL FUMARATE 200; 300 MG/1; MG/1
TABLET, FILM COATED ORAL
COMMUNITY
Start: 2023-11-04

## 2023-12-29 ASSESSMENT — ANXIETY QUESTIONNAIRES
2. NOT BEING ABLE TO STOP OR CONTROL WORRYING: NEARLY EVERY DAY
7. FEELING AFRAID AS IF SOMETHING AWFUL MIGHT HAPPEN: NEARLY EVERY DAY
8. IF YOU CHECKED OFF ANY PROBLEMS, HOW DIFFICULT HAVE THESE MADE IT FOR YOU TO DO YOUR WORK, TAKE CARE OF THINGS AT HOME, OR GET ALONG WITH OTHER PEOPLE?: EXTREMELY DIFFICULT
GAD7 TOTAL SCORE: 15
3. WORRYING TOO MUCH ABOUT DIFFERENT THINGS: NEARLY EVERY DAY
GAD7 TOTAL SCORE: 15
5. BEING SO RESTLESS THAT IT IS HARD TO SIT STILL: NOT AT ALL
GAD7 TOTAL SCORE: 15
1. FEELING NERVOUS, ANXIOUS, OR ON EDGE: NEARLY EVERY DAY
4. TROUBLE RELAXING: MORE THAN HALF THE DAYS
6. BECOMING EASILY ANNOYED OR IRRITABLE: SEVERAL DAYS
7. FEELING AFRAID AS IF SOMETHING AWFUL MIGHT HAPPEN: NEARLY EVERY DAY
IF YOU CHECKED OFF ANY PROBLEMS ON THIS QUESTIONNAIRE, HOW DIFFICULT HAVE THESE PROBLEMS MADE IT FOR YOU TO DO YOUR WORK, TAKE CARE OF THINGS AT HOME, OR GET ALONG WITH OTHER PEOPLE: EXTREMELY DIFFICULT

## 2023-12-29 ASSESSMENT — PAIN SCALES - GENERAL: PAINLEVEL: NO PAIN (0)

## 2023-12-29 ASSESSMENT — ENCOUNTER SYMPTOMS
HEMATURIA: 0
ABDOMINAL PAIN: 0
ARTHRALGIAS: 0
CONSTIPATION: 0
FREQUENCY: 0
MYALGIAS: 1
PARESTHESIAS: 0
EYE PAIN: 1
COUGH: 0
PALPITATIONS: 0
FEVER: 0
HEARTBURN: 1
DYSURIA: 0
NERVOUS/ANXIOUS: 1
NAUSEA: 1
JOINT SWELLING: 0
WEAKNESS: 0
DIZZINESS: 1
DIARRHEA: 1
HEMATOCHEZIA: 0
SORE THROAT: 0
CHILLS: 0
HEADACHES: 1

## 2023-12-29 ASSESSMENT — PATIENT HEALTH QUESTIONNAIRE - PHQ9
SUM OF ALL RESPONSES TO PHQ QUESTIONS 1-9: 4
10. IF YOU CHECKED OFF ANY PROBLEMS, HOW DIFFICULT HAVE THESE PROBLEMS MADE IT FOR YOU TO DO YOUR WORK, TAKE CARE OF THINGS AT HOME, OR GET ALONG WITH OTHER PEOPLE: SOMEWHAT DIFFICULT
SUM OF ALL RESPONSES TO PHQ QUESTIONS 1-9: 4

## 2023-12-29 NOTE — PROGRESS NOTES
"SUBJECTIVE:   Arjun is a 33 year old, presenting for the following:  Physical (Fasting) and Mental Health Problem (Has had life long issues with anxiety but has had more frequent flare ups. During episodes he feels heart thumping in throat. Has done EKG with smart watch while it is happening and it comes back as normal sinus rhythm )        12/29/2023     3:40 PM   Additional Questions   Roomed by Kari De Paz CMA     Feels like he's getting episodes of a random \"thump\" that he feels in his chest and heart.  Wears an apple watch with the ECG monitor.  Admits to having more \"health anxiety\".      Healthy Habits:     Getting at least 3 servings of Calcium per day:  Yes    Bi-annual eye exam:  NO    Dental care twice a year:  NO    Sleep apnea or symptoms of sleep apnea:  None    Diet:  Regular (no restrictions)    Frequency of exercise:  None    Taking medications regularly:  Yes    Medication side effects:  None    Additional concerns today:  No      Today's PHQ-9 Score:       12/29/2023     3:40 PM   PHQ-9 SCORE   PHQ-9 Total Score MyChart 4 (Minimal depression)   PHQ-9 Total Score 4       Have you ever done Advance Care Planning? (For example, a Health Directive, POLST, or a discussion with a medical provider or your loved ones about your wishes): No, advance care planning information given to patient to review.  Advanced care planning was discussed at today's visit.    Social History     Tobacco Use    Smoking status: Never     Passive exposure: Yes    Smokeless tobacco: Never   Substance Use Topics    Alcohol use: Not Currently     Alcohol/week: 1.0 standard drink of alcohol     Comment: Alcoholic Drinks/day: occasionally binge             12/24/2023     4:51 PM   Alcohol Use   Prescreen: >3 drinks/day or >7 drinks/week? No       Last PSA: No results found for: \"PSA\"    Reviewed orders with patient. Reviewed health maintenance and updated orders accordingly - Yes  Lab work is in process    Reviewed and updated as " "needed this visit by clinical staff   Tobacco  Allergies  Meds              Reviewed and updated as needed this visit by Provider                 Past Medical History:   Diagnosis Date    Colitis     Suicidal ideations       Past Surgical History:   Procedure Laterality Date    OTHER SURGICAL HISTORY      none       Review of Systems   Constitutional:  Negative for chills and fever.   HENT:  Negative for congestion, ear pain, hearing loss and sore throat.    Eyes:  Positive for pain. Negative for visual disturbance.   Respiratory:  Negative for cough.    Cardiovascular:  Positive for chest pain. Negative for palpitations and peripheral edema.   Gastrointestinal:  Positive for diarrhea, heartburn and nausea. Negative for abdominal pain, constipation and hematochezia.   Genitourinary:  Negative for dysuria, frequency, genital sores, hematuria, impotence, penile discharge and urgency.   Musculoskeletal:  Positive for myalgias. Negative for arthralgias and joint swelling.   Skin:  Negative for rash.   Neurological:  Positive for dizziness and headaches. Negative for weakness and paresthesias.   Psychiatric/Behavioral:  Negative for mood changes. The patient is nervous/anxious.          OBJECTIVE:   /80 (BP Location: Left arm, Patient Position: Sitting, Cuff Size: Adult Regular)   Pulse 89   Temp 97.9  F (36.6  C) (Oral)   Resp 16   Ht 1.727 m (5' 8\")   Wt 92.6 kg (204 lb 3.2 oz)   SpO2 96%   BMI 31.05 kg/m      Physical Exam  GENERAL: healthy, alert and no distress  EYES: Eyes grossly normal to inspection, PERRL and conjunctivae and sclerae normal  HENT: ear canals and TM's normal, nose and mouth without ulcers or lesions  NECK: no adenopathy, no asymmetry, masses, or scars and thyroid normal to palpation  RESP: lungs clear to auscultation - no rales, rhonchi or wheezes  CV: regular rate and rhythm, normal S1 S2, no S3 or S4, no murmur, click or rub, no peripheral edema and peripheral pulses " "strong  ABDOMEN: soft, nontender, no hepatosplenomegaly, no masses and bowel sounds normal  MS: no gross musculoskeletal defects noted, no edema  SKIN: no suspicious lesions or rashes  NEURO: Normal strength and tone, mentation intact and speech normal  PSYCH: mentation appears normal, affect normal/bright    Diagnostic Test Results:  Labs reviewed in Epic    ASSESSMENT/PLAN:       ICD-10-CM    1. Routine general medical examination at a health care facility  Z00.00       2. Bipolar Disorder  F30.9 PRIMARY CARE FOLLOW-UP SCHEDULING      3. Generalized anxiety disorder  F41.1 PRIMARY CARE FOLLOW-UP SCHEDULING      4. Panic attack  F41.0 PRIMARY CARE FOLLOW-UP SCHEDULING      5. Need for hepatitis C screening test  Z11.59         Overall from a physical standpoint is doing well.  Declines shots and screening lab work today.  Discussed recent ED visits.  Reassurance provided.  Reviewed with patient psychiatry recommendations.  He has appropriate questions about Lamictal which were answered.  Encouraged to take Lamictal as prescribed.  Continue working with mental health specialists.    Reviewed ED note x 2, ECG x 1, psychiatry note x 2, metabolic panel x 1    Patient has been advised of split billing requirements and indicates understanding: Yes      COUNSELING:   Reviewed preventive health counseling, as reflected in patient instructions      BMI:   Estimated body mass index is 31.05 kg/m  as calculated from the following:    Height as of this encounter: 1.727 m (5' 8\").    Weight as of this encounter: 92.6 kg (204 lb 3.2 oz).   Weight management plan: Discussed healthy diet and exercise guidelines      He reports that he has never smoked. He has been exposed to tobacco smoke. He has never used smokeless tobacco.          Sharan Caputo NP  [facility]  Answers submitted by the patient for this visit:  Patient Health Questionnaire (Submitted on 12/29/2023)  If you checked off any problems, " how difficult have these problems made it for you to do your work, take care of things at home, or get along with other people?: Somewhat difficult  PHQ9 TOTAL SCORE: 4  TOM-7 (Submitted on 12/29/2023)  TOM 7 TOTAL SCORE: 15

## 2023-12-29 NOTE — PATIENT INSTRUCTIONS
Go ahead and start those meds from psychiatry.      Your heart looks good!     Feel free to schedule a lab only appointment at your leisure.    You can also update shots at any time.       Preventive Health Recommendations  Male Ages 26 - 39    Yearly exam:             See your health care provider every year in order to  o   Review health changes.   o   Discuss preventive care.    o   Review your medicines if your doctor has prescribed any.  You should be tested each year for STDs (sexually transmitted diseases), if you re at risk.   After age 35, talk to your provider about cholesterol testing. If you are at risk for heart disease, have your cholesterol tested at least every 5 years.   If you are at risk for diabetes, you should have a diabetes test (fasting glucose).  Shots: Get a flu shot each year. Get a tetanus shot every 10 years.     Nutrition:  Eat at least 5 servings of fruits and vegetables daily.   Eat whole-grain bread, whole-wheat pasta and brown rice instead of white grains and rice.   Get adequate Calcium and Vitamin D.     Lifestyle  Exercise for at least 150 minutes a week (30 minutes a day, 5 days a week). This will help you control your weight and prevent disease.   Limit alcohol to one drink per day.   No smoking.   Wear sunscreen to prevent skin cancer.   See your dentist every six months for an exam and cleaning.

## 2024-01-07 ENCOUNTER — HOSPITAL ENCOUNTER (EMERGENCY)
Facility: CLINIC | Age: 34
Discharge: HOME | End: 2024-01-07
Attending: STUDENT IN AN ORGANIZED HEALTH CARE EDUCATION/TRAINING PROGRAM | Admitting: STUDENT IN AN ORGANIZED HEALTH CARE EDUCATION/TRAINING PROGRAM
Payer: COMMERCIAL

## 2024-01-07 VITALS
HEIGHT: 66 IN | DIASTOLIC BLOOD PRESSURE: 78 MMHG | HEART RATE: 77 BPM | WEIGHT: 204 LBS | SYSTOLIC BLOOD PRESSURE: 120 MMHG | BODY MASS INDEX: 32.78 KG/M2 | TEMPERATURE: 97.5 F | RESPIRATION RATE: 23 BRPM | OXYGEN SATURATION: 97 %

## 2024-01-07 DIAGNOSIS — R00.2 PALPITATIONS: ICD-10-CM

## 2024-01-07 LAB
ALBUMIN SERPL BCG-MCNC: 5.4 G/DL (ref 3.5–5.2)
ALP SERPL-CCNC: 144 U/L (ref 40–150)
ALT SERPL W P-5'-P-CCNC: 39 U/L (ref 0–70)
ANION GAP SERPL CALCULATED.3IONS-SCNC: 12 MMOL/L (ref 7–15)
AST SERPL W P-5'-P-CCNC: 29 U/L (ref 0–45)
ATRIAL RATE - MUSE: 89 BPM
BASOPHILS # BLD AUTO: 0 10E3/UL (ref 0–0.2)
BASOPHILS NFR BLD AUTO: 0 %
BILIRUB SERPL-MCNC: 1 MG/DL
BUN SERPL-MCNC: 10.2 MG/DL (ref 6–20)
CALCIUM SERPL-MCNC: 10 MG/DL (ref 8.6–10)
CHLORIDE SERPL-SCNC: 103 MMOL/L (ref 98–107)
CREAT SERPL-MCNC: 0.78 MG/DL (ref 0.67–1.17)
DEPRECATED HCO3 PLAS-SCNC: 24 MMOL/L (ref 22–29)
DIASTOLIC BLOOD PRESSURE - MUSE: 95 MMHG
EGFRCR SERPLBLD CKD-EPI 2021: >90 ML/MIN/1.73M2
EOSINOPHIL # BLD AUTO: 0 10E3/UL (ref 0–0.7)
EOSINOPHIL NFR BLD AUTO: 1 %
ERYTHROCYTE [DISTWIDTH] IN BLOOD BY AUTOMATED COUNT: 12.5 % (ref 10–15)
GLUCOSE SERPL-MCNC: 127 MG/DL (ref 70–99)
HCT VFR BLD AUTO: 48.6 % (ref 40–53)
HGB BLD-MCNC: 17 G/DL (ref 13.3–17.7)
IMM GRANULOCYTES # BLD: 0 10E3/UL
IMM GRANULOCYTES NFR BLD: 0 %
INTERPRETATION ECG - MUSE: NORMAL
LYMPHOCYTES # BLD AUTO: 2.4 10E3/UL (ref 0.8–5.3)
LYMPHOCYTES NFR BLD AUTO: 29 %
MAGNESIUM SERPL-MCNC: 2.2 MG/DL (ref 1.7–2.3)
MCH RBC QN AUTO: 31 PG (ref 26.5–33)
MCHC RBC AUTO-ENTMCNC: 35 G/DL (ref 31.5–36.5)
MCV RBC AUTO: 89 FL (ref 78–100)
MONOCYTES # BLD AUTO: 0.4 10E3/UL (ref 0–1.3)
MONOCYTES NFR BLD AUTO: 4 %
NEUTROPHILS # BLD AUTO: 5.5 10E3/UL (ref 1.6–8.3)
NEUTROPHILS NFR BLD AUTO: 66 %
NRBC # BLD AUTO: 0 10E3/UL
NRBC BLD AUTO-RTO: 0 /100
P AXIS - MUSE: 75 DEGREES
PLATELET # BLD AUTO: 246 10E3/UL (ref 150–450)
POTASSIUM SERPL-SCNC: 3.4 MMOL/L (ref 3.4–5.3)
PR INTERVAL - MUSE: 142 MS
PROT SERPL-MCNC: 8.3 G/DL (ref 6.4–8.3)
QRS DURATION - MUSE: 88 MS
QT - MUSE: 330 MS
QTC - MUSE: 401 MS
R AXIS - MUSE: 48 DEGREES
RBC # BLD AUTO: 5.49 10E6/UL (ref 4.4–5.9)
SODIUM SERPL-SCNC: 139 MMOL/L (ref 135–145)
SYSTOLIC BLOOD PRESSURE - MUSE: 146 MMHG
T AXIS - MUSE: 52 DEGREES
TROPONIN T SERPL HS-MCNC: <6 NG/L
VENTRICULAR RATE- MUSE: 89 BPM
WBC # BLD AUTO: 8.3 10E3/UL (ref 4–11)

## 2024-01-07 PROCEDURE — 93005 ELECTROCARDIOGRAM TRACING: CPT | Performed by: STUDENT IN AN ORGANIZED HEALTH CARE EDUCATION/TRAINING PROGRAM

## 2024-01-07 PROCEDURE — 84484 ASSAY OF TROPONIN QUANT: CPT | Performed by: STUDENT IN AN ORGANIZED HEALTH CARE EDUCATION/TRAINING PROGRAM

## 2024-01-07 PROCEDURE — 36415 COLL VENOUS BLD VENIPUNCTURE: CPT | Performed by: STUDENT IN AN ORGANIZED HEALTH CARE EDUCATION/TRAINING PROGRAM

## 2024-01-07 PROCEDURE — 85025 COMPLETE CBC W/AUTO DIFF WBC: CPT | Performed by: STUDENT IN AN ORGANIZED HEALTH CARE EDUCATION/TRAINING PROGRAM

## 2024-01-07 PROCEDURE — 82247 BILIRUBIN TOTAL: CPT | Performed by: STUDENT IN AN ORGANIZED HEALTH CARE EDUCATION/TRAINING PROGRAM

## 2024-01-07 PROCEDURE — 93005 ELECTROCARDIOGRAM TRACING: CPT | Performed by: EMERGENCY MEDICINE

## 2024-01-07 PROCEDURE — 83735 ASSAY OF MAGNESIUM: CPT | Performed by: STUDENT IN AN ORGANIZED HEALTH CARE EDUCATION/TRAINING PROGRAM

## 2024-01-07 PROCEDURE — 99284 EMERGENCY DEPT VISIT MOD MDM: CPT

## 2024-01-08 NOTE — DISCHARGE INSTRUCTIONS
As we discussed, I think it is reasonable to restart your propranolol daily starting at 10 or 20 mg.    Return for any fainting spells, worsening chest pain or any other concerning symptoms.    We placed electronic referral for our rapid access cardiology clinic who should be reaching out to you tomorrow for Holter monitor placement.

## 2024-01-08 NOTE — ED TRIAGE NOTES
"Patient arrives to the ER with c/o heart palpitations that he started noticing yesterday evening. Since then, he has noticed them happening more frequently. States it feels like \"a thumping\". Denies shortness of breath or excessive caffeine usage. Has screen shots of episodes that he had recorded on his apple watch.     Triage Assessment (Adult)       Row Name 01/07/24 1707          Triage Assessment    Airway WDL WDL        Respiratory WDL    Respiratory WDL WDL        Skin Circulation/Temperature WDL    Skin Circulation/Temperature WDL WDL        Cardiac WDL    Cardiac WDL X;chest pain  palpitations        Chest Pain Assessment    Chest Pain Location midsternal     Character aching     Chest Pain Intervention 12-lead ECG obtained        Peripheral/Neurovascular WDL    Peripheral Neurovascular WDL WDL        Cognitive/Neuro/Behavioral WDL    Cognitive/Neuro/Behavioral WDL WDL                     "

## 2024-01-08 NOTE — PROGRESS NOTES
Patient discharged home with AVS. Will follow up with rapid access clinic at discharge. Vitals stable on RA. Discharged home with family.

## 2024-01-09 ENCOUNTER — LAB (OUTPATIENT)
Dept: LAB | Facility: CLINIC | Age: 34
End: 2024-01-09
Payer: COMMERCIAL

## 2024-01-09 DIAGNOSIS — Z11.59 NEED FOR HEPATITIS C SCREENING TEST: ICD-10-CM

## 2024-01-09 DIAGNOSIS — Z13.220 LIPID SCREENING: ICD-10-CM

## 2024-01-09 LAB — HCV AB SERPL QL IA: NONREACTIVE

## 2024-01-09 PROCEDURE — 86803 HEPATITIS C AB TEST: CPT

## 2024-01-09 PROCEDURE — 80061 LIPID PANEL: CPT

## 2024-01-09 PROCEDURE — 36415 COLL VENOUS BLD VENIPUNCTURE: CPT

## 2024-01-10 LAB
CHOLEST SERPL-MCNC: 186 MG/DL
FASTING STATUS PATIENT QL REPORTED: ABNORMAL
HDLC SERPL-MCNC: 43 MG/DL
LDLC SERPL CALC-MCNC: 123 MG/DL
NONHDLC SERPL-MCNC: 143 MG/DL
TRIGL SERPL-MCNC: 100 MG/DL

## 2024-01-19 ENCOUNTER — OFFICE VISIT (OUTPATIENT)
Dept: CARDIOLOGY | Facility: CLINIC | Age: 34
End: 2024-01-19
Attending: STUDENT IN AN ORGANIZED HEALTH CARE EDUCATION/TRAINING PROGRAM
Payer: COMMERCIAL

## 2024-01-19 VITALS
DIASTOLIC BLOOD PRESSURE: 82 MMHG | WEIGHT: 202 LBS | SYSTOLIC BLOOD PRESSURE: 148 MMHG | OXYGEN SATURATION: 97 % | BODY MASS INDEX: 32.6 KG/M2 | HEART RATE: 90 BPM

## 2024-01-19 DIAGNOSIS — R00.2 PALPITATIONS: Primary | ICD-10-CM

## 2024-01-19 PROCEDURE — 99244 OFF/OP CNSLTJ NEW/EST MOD 40: CPT | Performed by: INTERNAL MEDICINE

## 2024-01-19 NOTE — PROGRESS NOTES
Thank you, Dr. Schmitt, for asking [facility] to evaluate Mr. Arjun Vickers.      Assessment/Recommendations   Assessment:    Heart palpitation due to benign ectopies likely related to uncontrolled anxiety  Generalized anxiety disorder with history of panic attacks  Bipolar disorder    Plan:  I reassured him that his cardiac exam was normal today.  I told him that rare isolated ectopies do not pose any significant risk of stroke, heart attack, cardiomyopathy etc.  Will to get Holter to quantify ectopies and echo to rule out structural heart abnormalities(highly doubtful based on exam today)    I encouraged him to work with psychiatrist/psychologist on improving his mental health.  He was on propranolol in the past and I think it may be reasonable to put him back to help with anxiety and palpitation.  I would leave decision about propranolol up to his  mental health experts       History of Present Illness    Mr. Arjun Vickers is a 33 year old male who comes in for cardiac evaluation.  He has been experiencing heart palpitations in last few weeks.  He  notices sensation of heart skipping.  He has never had prolonged heart racing or syncope.  He feels that skipping is more likely to happen when he is either sleep deprived or stressed out.  He has no history of known heart disease.  He denies chest pain or shortness of breath with physical activities.  Her palpitations do not get worse during physical activities.  He did go to emergency room heart palpitations.  He had negative workup    He has a longstanding history of anxiety and bipolar disorder.  He has been on a number of different medications in the past.    ECG: Personally reviewed.  1/7/2024 normal sinus rhythm within normal limits.         Physical Examination Review of Systems   BP (!) 148/82 (BP Location: Right arm, Patient Position: Sitting, Cuff Size: Adult Large)   Pulse 90   Wt 91.6 kg (202 lb)   SpO2 97%   BMI 32.60 kg/m    Body mass  "index is 32.6 kg/m .  Wt Readings from Last 3 Encounters:   01/19/24 91.6 kg (202 lb)   01/07/24 92.5 kg (204 lb)   12/29/23 92.6 kg (204 lb 3.2 oz)     General Appearance:   Alert, cooperative, no distress, appears stated age   Head/ENT: Normocephalic, without obvious abnormality. Membranes moist      EYES:  no scleral icterus, normal conjunctivae   Neck: Supple, symmetrical, trachea midline, no adenopathy, thyroid: not enlarged, symmetric, no carotid bruit or JVD   Chest/Lungs:   Lungs are clear to auscultation, respirations unlabored. No tenderness or deformity    Cardiovascular:   Regular rhythm, S1, S2 normal, no murmur, rub or gallop.   Abdomen:  Soft, non-tender, bowel sounds active all four quadrants,  no masses, no organomegaly   Extremities: no cyanosis or clubbing. No edema   Skin: Skin color, texture, turgor normal, no rashes or lesions.    Psychiatric: Normal affect, calm   Neurologic: Alert and oriented x 3, moving all four extremities.     Enc Vitals  BP: (!) 148/82  Pulse: 90  SpO2: 97 %  Weight: 91.6 kg (202 lb)                                          Lab Results    Chemistry/lipid CBC Cardiac Enzymes/BNP/TSH/INR   Recent Labs   Lab Test 01/09/24  1507   CHOL 186   HDL 43   *   TRIG 100     Recent Labs   Lab Test 01/09/24  1507   *     Recent Labs   Lab Test 01/07/24  1829      POTASSIUM 3.4   CHLORIDE 103   CO2 24   *   BUN 10.2   CR 0.78   GFRESTIMATED >90   YARI 10.0     Recent Labs   Lab Test 01/07/24 1829 12/08/23  1849 11/12/23  1837   CR 0.78 0.85 0.79     No results for input(s): \"A1C\" in the last 04922 hours.       Recent Labs   Lab Test 01/07/24  1829   WBC 8.3   HGB 17.0   HCT 48.6   MCV 89        Recent Labs   Lab Test 01/07/24 1829 12/08/23  1849 11/12/23  1923   HGB 17.0 16.6 17.0    Recent Labs   Lab Test 10/01/18  2003   TROPONINI <0.01     No results for input(s): \"BNP\", \"NTBNPI\", \"NTBNP\" in the last 74913 hours.  Recent Labs   Lab Test " "11/12/23  1837   TSH 0.75     No results for input(s): \"INR\" in the last 02473 hours.     Medical History  Surgical History Family History Social History   Past Medical History:   Diagnosis Date    Colitis     Suicidal ideations      Past Surgical History:   Procedure Laterality Date    OTHER SURGICAL HISTORY      none     No premature CAD, SCD,cardiomyopathy, pacemaker   Social History     Socioeconomic History    Marital status: Single     Spouse name: Not on file    Number of children: 0    Years of education: 12    Highest education level: Not on file   Occupational History    Not on file   Tobacco Use    Smoking status: Never     Passive exposure: Yes    Smokeless tobacco: Never   Substance and Sexual Activity    Alcohol use: Not Currently     Alcohol/week: 1.0 standard drink of alcohol     Comment: Alcoholic Drinks/day: occasionally binge    Drug use: Never     Types: Marijuana    Sexual activity: Yes     Partners: Male     Birth control/protection: Pill   Other Topics Concern    Parent/sibling w/ CABG, MI or angioplasty before 65F 55M? No   Social History Narrative    Not on file     Social Determinants of Health     Financial Resource Strain: Low Risk  (12/24/2023)    Financial Resource Strain     Within the past 12 months, have you or your family members you live with been unable to get utilities (heat, electricity) when it was really needed?: No   Food Insecurity: Low Risk  (12/24/2023)    Food Insecurity     Within the past 12 months, did you worry that your food would run out before you got money to buy more?: No     Within the past 12 months, did the food you bought just not last and you didn t have money to get more?: No   Transportation Needs: Low Risk  (12/24/2023)    Transportation Needs     Within the past 12 months, has lack of transportation kept you from medical appointments, getting your medicines, non-medical meetings or appointments, work, or from getting things that you need?: No   Physical " Activity: Not on file   Stress: Not on file   Social Connections: Not on file   Interpersonal Safety: Low Risk  (12/29/2023)    Interpersonal Safety     Do you feel physically and emotionally safe where you currently live?: Yes     Within the past 12 months, have you been hit, slapped, kicked or otherwise physically hurt by someone?: No     Within the past 12 months, have you been humiliated or emotionally abused in other ways by your partner or ex-partner?: No   Housing Stability: Low Risk  (12/24/2023)    Housing Stability     Do you have housing? : Yes     Are you worried about losing your housing?: No         Medications  Allergies   Current Outpatient Medications   Medication Sig Dispense Refill    diazepam (VALIUM) 5 MG tablet Take 1 tablet (5 mg) by mouth daily as needed for anxiety 5 tabs to last 30 days (Patient not taking: Reported on 1/19/2024) 5 tablet 0    emtricitabine-tenofovir (TRUVADA) 200-300 MG per tablet TAKE 1 TABLET BY MOUTH DAILY FOR HIV PREVENTION (Patient not taking: Reported on 1/19/2024)      [START ON 1/22/2024] lamoTRIgine (LAMICTAL) 100 MG tablet Take one tab by mouth for 2 weeks then increase to 1.5 tabs for 2 weeks then take 2 tabs daily. (Patient not taking: Reported on 12/29/2023) 60 tablet 1    lamoTRIgine (LAMICTAL) 25 MG tablet Take 25 mg by mouth daily for 2 weeks then 50 mg daily for 2 weeks then 100 mg daily. (Patient not taking: Reported on 12/29/2023) 42 tablet 0    OLANZapine (ZYPREXA) 10 MG tablet Take 0.5-1 tablets (5-10 mg) by mouth nightly as needed (racing thoughts, anxiety, sleep) (Patient not taking: Reported on 12/29/2023) 90 tablet 0        Allergies   Allergen Reactions    Lithium Rash

## 2024-01-19 NOTE — LETTER
1/19/2024    Sharan Caputo, NP  [address] [place] [address]    RE: Arjun Vickers       Dear Colleague,     I had the pleasure of seeing Arjun Vickers in the [facility].        Thank you, Dr. Schmitt, for asking [facility] to evaluate Mr. Arjun Vickers.      Assessment/Recommendations   Assessment:    Heart palpitation due to benign ectopies likely related to uncontrolled anxiety  Generalized anxiety disorder with history of panic attacks  Bipolar disorder    Plan:  I reassured him that his cardiac exam was normal today.  I told him that rare isolated ectopies do not pose any significant risk of stroke, heart attack, cardiomyopathy etc.  Will to get Holter to quantify ectopies and echo to rule out structural heart abnormalities(highly doubtful based on exam today)    I encouraged him to work with psychiatrist/psychologist on improving his mental health.  He was on propranolol in the past and I think it may be reasonable to put him back to help with anxiety and palpitation.  I would leave decision about propranolol up to his  mental health experts       History of Present Illness    Mr. Arjun Vickers is a 33 year old male who comes in for cardiac evaluation.  He has been experiencing heart palpitations in last few weeks.  He  notices sensation of heart skipping.  He has never had prolonged heart racing or syncope.  He feels that skipping is more likely to happen when he is either sleep deprived or stressed out.  He has no history of known heart disease.  He denies chest pain or shortness of breath with physical activities.  Her palpitations do not get worse during physical activities.  He did go to emergency room heart palpitations.  He had negative workup    He has a longstanding history of anxiety and bipolar disorder.  He has been on a number of different medications in the past.    ECG: Personally reviewed.  1/7/2024 normal sinus rhythm within normal limits.      "    Physical Examination Review of Systems   BP (!) 148/82 (BP Location: Right arm, Patient Position: Sitting, Cuff Size: Adult Large)   Pulse 90   Wt 91.6 kg (202 lb)   SpO2 97%   BMI 32.60 kg/m    Body mass index is 32.6 kg/m .  Wt Readings from Last 3 Encounters:   01/19/24 91.6 kg (202 lb)   01/07/24 92.5 kg (204 lb)   12/29/23 92.6 kg (204 lb 3.2 oz)     General Appearance:   Alert, cooperative, no distress, appears stated age   Head/ENT: Normocephalic, without obvious abnormality. Membranes moist      EYES:  no scleral icterus, normal conjunctivae   Neck: Supple, symmetrical, trachea midline, no adenopathy, thyroid: not enlarged, symmetric, no carotid bruit or JVD   Chest/Lungs:   Lungs are clear to auscultation, respirations unlabored. No tenderness or deformity    Cardiovascular:   Regular rhythm, S1, S2 normal, no murmur, rub or gallop.   Abdomen:  Soft, non-tender, bowel sounds active all four quadrants,  no masses, no organomegaly   Extremities: no cyanosis or clubbing. No edema   Skin: Skin color, texture, turgor normal, no rashes or lesions.    Psychiatric: Normal affect, calm   Neurologic: Alert and oriented x 3, moving all four extremities.     Enc Vitals  BP: (!) 148/82  Pulse: 90  SpO2: 97 %  Weight: 91.6 kg (202 lb)                                          Lab Results    Chemistry/lipid CBC Cardiac Enzymes/BNP/TSH/INR   Recent Labs   Lab Test 01/09/24  1507   CHOL 186   HDL 43   *   TRIG 100     Recent Labs   Lab Test 01/09/24  1507   *     Recent Labs   Lab Test 01/07/24  1829      POTASSIUM 3.4   CHLORIDE 103   CO2 24   *   BUN 10.2   CR 0.78   GFRESTIMATED >90   YARI 10.0     Recent Labs   Lab Test 01/07/24  1829 12/08/23  1849 11/12/23  1837   CR 0.78 0.85 0.79     No results for input(s): \"A1C\" in the last 83232 hours.       Recent Labs   Lab Test 01/07/24  1829   WBC 8.3   HGB 17.0   HCT 48.6   MCV 89        Recent Labs   Lab Test 01/07/24  1829 " "12/08/23  1849 11/12/23  1923   HGB 17.0 16.6 17.0    Recent Labs   Lab Test 10/01/18  2003   TROPONINI <0.01     No results for input(s): \"BNP\", \"NTBNPI\", \"NTBNP\" in the last 58091 hours.  Recent Labs   Lab Test 11/12/23  1837   TSH 0.75     No results for input(s): \"INR\" in the last 11103 hours.     Medical History  Surgical History Family History Social History   Past Medical History:   Diagnosis Date    Colitis     Suicidal ideations      Past Surgical History:   Procedure Laterality Date    OTHER SURGICAL HISTORY      none     No premature CAD, SCD,cardiomyopathy, pacemaker   Social History     Socioeconomic History    Marital status: Single     Spouse name: Not on file    Number of children: 0    Years of education: 12    Highest education level: Not on file   Occupational History    Not on file   Tobacco Use    Smoking status: Never     Passive exposure: Yes    Smokeless tobacco: Never   Substance and Sexual Activity    Alcohol use: Not Currently     Alcohol/week: 1.0 standard drink of alcohol     Comment: Alcoholic Drinks/day: occasionally binge    Drug use: Never     Types: Marijuana    Sexual activity: Yes     Partners: Male     Birth control/protection: Pill   Other Topics Concern    Parent/sibling w/ CABG, MI or angioplasty before 65F 55M? No   Social History Narrative    Not on file     Social Determinants of Health     Financial Resource Strain: Low Risk  (12/24/2023)    Financial Resource Strain     Within the past 12 months, have you or your family members you live with been unable to get utilities (heat, electricity) when it was really needed?: No   Food Insecurity: Low Risk  (12/24/2023)    Food Insecurity     Within the past 12 months, did you worry that your food would run out before you got money to buy more?: No     Within the past 12 months, did the food you bought just not last and you didn t have money to get more?: No   Transportation Needs: Low Risk  (12/24/2023)    Transportation Needs    "  Within the past 12 months, has lack of transportation kept you from medical appointments, getting your medicines, non-medical meetings or appointments, work, or from getting things that you need?: No   Physical Activity: Not on file   Stress: Not on file   Social Connections: Not on file   Interpersonal Safety: Low Risk  (12/29/2023)    Interpersonal Safety     Do you feel physically and emotionally safe where you currently live?: Yes     Within the past 12 months, have you been hit, slapped, kicked or otherwise physically hurt by someone?: No     Within the past 12 months, have you been humiliated or emotionally abused in other ways by your partner or ex-partner?: No   Housing Stability: Low Risk  (12/24/2023)    Housing Stability     Do you have housing? : Yes     Are you worried about losing your housing?: No         Medications  Allergies   Current Outpatient Medications   Medication Sig Dispense Refill    diazepam (VALIUM) 5 MG tablet Take 1 tablet (5 mg) by mouth daily as needed for anxiety 5 tabs to last 30 days (Patient not taking: Reported on 1/19/2024) 5 tablet 0    emtricitabine-tenofovir (TRUVADA) 200-300 MG per tablet TAKE 1 TABLET BY MOUTH DAILY FOR HIV PREVENTION (Patient not taking: Reported on 1/19/2024)      [START ON 1/22/2024] lamoTRIgine (LAMICTAL) 100 MG tablet Take one tab by mouth for 2 weeks then increase to 1.5 tabs for 2 weeks then take 2 tabs daily. (Patient not taking: Reported on 12/29/2023) 60 tablet 1    lamoTRIgine (LAMICTAL) 25 MG tablet Take 25 mg by mouth daily for 2 weeks then 50 mg daily for 2 weeks then 100 mg daily. (Patient not taking: Reported on 12/29/2023) 42 tablet 0    OLANZapine (ZYPREXA) 10 MG tablet Take 0.5-1 tablets (5-10 mg) by mouth nightly as needed (racing thoughts, anxiety, sleep) (Patient not taking: Reported on 12/29/2023) 90 tablet 0        Allergies   Allergen Reactions    Lithium Rash                        Thank you for allowing me to participate in the  care of your patient.      Sincerely,     Anirudh Johnson MD     [facility]  cc:   Taco Schmitt, DO  [facility]  [address] [place] [address]

## 2024-01-19 NOTE — PATIENT INSTRUCTIONS
Arjun Vickers,    It was a pleasure to see you today at the [facility].     My recommendations after this visit include:    Holter and echo    WRussel Johnson MD, FACC, CARRI

## 2024-01-29 ENCOUNTER — HOSPITAL ENCOUNTER (OUTPATIENT)
Dept: CARDIOLOGY | Facility: CLINIC | Age: 34
Discharge: HOME | End: 2024-01-29
Attending: INTERNAL MEDICINE
Payer: COMMERCIAL

## 2024-01-29 DIAGNOSIS — R00.2 PALPITATIONS: ICD-10-CM

## 2024-01-29 LAB — LVEF ECHO: NORMAL

## 2024-01-29 PROCEDURE — 93306 TTE W/DOPPLER COMPLETE: CPT

## 2024-01-29 PROCEDURE — 93225 XTRNL ECG REC<48 HRS REC: CPT

## 2024-01-29 PROCEDURE — 93306 TTE W/DOPPLER COMPLETE: CPT | Mod: 26 | Performed by: INTERNAL MEDICINE

## 2024-02-02 PROCEDURE — 93227 XTRNL ECG REC<48 HR R&I: CPT | Performed by: INTERNAL MEDICINE

## 2024-03-25 NOTE — PROGRESS NOTES
HEART CARE ENCOUNTER NOTE      [facility]  [phone]      Assessment/Recommendations   Assessment:   Heart palpitations: Holter monitor 1/29/2024 showed less than 1% burden of PVCs and PACs with no other sustained arrhythmias.  Patient had previously noted the PVCs when he had anxiety.  Notes that happening more often and occur many times even when feeling relaxed.  Patient notes he will take his breath away when having them.  Notes he will have many in an hour and then they will improve for a little bit and then come back the next day.  Discussed with patient that these are benign and that his echocardiogram showed normal ejection fraction.  Patient is unsure if his anxiety is causing the PVCs or if the PVCs are making his anxiety worse.  Patient had been on propranolol 20 mg 3 times a day previously but had only taken it once a day as he noted heart rates in the 50s which scared him.  Patient denied lightheadedness at that time.  Reassured him and will try 10 mg tablets twice daily of propranolol.  Generalized anxiety disorder: With history of panic attacks  Bipolar disorder      Plan:   Start propranolol 10 mg twice daily for PVCs and anxiety  If patient develops lightheadedness or dizziness will let us know and can discontinue the propranolol  Can continue with magnesium as it has seemed to help the PVCs  Follow-up as needed         History of Present Illness/Subjective    HPI: Arjun Vickers is a 33 year old male with PMHx of heart palpitations, generalized anxiety disorder, bipolar disorder presents for follow-up.  Patient last saw Dr. Johnson 1/19/2024 for cardiac evaluation of heart palpitations.  Patient was noting them more when stressed out or sleep deprived.  Echocardiogram was obtained that showed no structural abnormalities.  Holter monitor was obtained that showed less than 1% burden of PVCs and PACs with no other arrhythmias noted.  He was encouraged to work with  "psychiatrist/psychologist on improving mental health.    Patient notes that he was previously getting PVCs when he was anxious and are happening more often.  Notes that occur at random times even when he is not feeling stressed.  Patient notes that they will temporarily take his breath away.  Patient also notes some chest discomfort but that he feels it is more related to tensing his body due to anxiety.  Was in the ER and diagnosed with costochondritis.  Notes tenderness when pressing on the chest today.  Patient notes he will feel them when he gets off of the couch and starts moving as well as when lying down.  Patient has been avoiding caffeine and alcohol and takes magnesium.  Had some improvement until more recently with the palpitations.  Denies exertional chest discomfort or shortness of breath.       Echocardiogram 1/29/2024 results:  The left ventricle is normal in size with normal left ventricular wall  thickness. Left ventricular function is normal.The ejection fraction is 60-  65%.  Normal right ventricle size and systolic function.  No hemodynamically significant valvular abnormalities on 2D or color flow  imaging.  There is no comparison study available.    Holter monitor 1/29/2024:  Holter monitoring (duration 24hrs).  Predominant underlying rhythm was sinus rhythm, 59 to 114 bpm, average 77 bpm.  No nonsustained or sustained tachyarrhythmias.  No atrial fibrillation.  There were no pauses of greater than 3 seconds.  Rare supraventricular ectopic beats (<1%).  Rare premature ventricular contractions (<1%).  Symptom triggers -palpitations on 2 occasions correlated with normal sinus rhythm.     Electronically signed by Lan Avila MD  1/31/2024  4:16 PM     Physical Examination  Review of Systems   Vitals: /70 (BP Location: Left arm, Patient Position: Sitting, Cuff Size: Adult Regular)   Pulse 85   Resp 16   Ht 1.727 m (5' 8\")   Wt 90.4 kg (199 lb 3.2 oz)   SpO2 97%   BMI 30.29 kg/m  "   BMI= Body mass index is 30.29 kg/m .  Wt Readings from Last 3 Encounters:   03/26/24 90.4 kg (199 lb 3.2 oz)   01/19/24 91.6 kg (202 lb)   01/07/24 92.5 kg (204 lb)           ENT/Mouth: membranes moist, no oral lesions or bleeding gums.      EYES:  no scleral icterus, normal conjunctivae       Chest/Lungs:   lungs are clear to auscultation, no rales or wheezing,  equal chest wall expansion    Cardiovascular:   Regular. Normal first and second heart sounds with no murmurs, rubs, or gallops; the carotid, radial and posterior tibial pulses are intact       Extremities: no cyanosis or clubbing   Skin: no xanthelasma, warm.    Neurologic: no tremors     Psychiatric: alert and oriented x3, calm        Please refer above for cardiac ROS details.        Medical History  Surgical History Family History Social History   Past Medical History:   Diagnosis Date    Colitis     Suicidal ideations      Past Surgical History:   Procedure Laterality Date    OTHER SURGICAL HISTORY      none     Family History   Problem Relation Age of Onset    Bipolar Disorder Mother     Clotting Disorder Mother     No Known Problems Brother         Social History     Socioeconomic History    Marital status: Single     Spouse name: Not on file    Number of children: 0    Years of education: 12    Highest education level: Not on file   Occupational History    Not on file   Tobacco Use    Smoking status: Never     Passive exposure: Yes    Smokeless tobacco: Never   Substance and Sexual Activity    Alcohol use: Not Currently     Alcohol/week: 1.0 standard drink of alcohol     Comment: Alcoholic Drinks/day: occasionally binge    Drug use: Never     Types: Marijuana    Sexual activity: Yes     Partners: Male     Birth control/protection: Pill   Other Topics Concern    Parent/sibling w/ CABG, MI or angioplasty before 65F 55M? No   Social History Narrative    Not on file     Social Determinants of Health     Financial Resource Strain: Low Risk   (12/24/2023)    Financial Resource Strain     Within the past 12 months, have you or your family members you live with been unable to get utilities (heat, electricity) when it was really needed?: No   Food Insecurity: Low Risk  (12/24/2023)    Food Insecurity     Within the past 12 months, did you worry that your food would run out before you got money to buy more?: No     Within the past 12 months, did the food you bought just not last and you didn t have money to get more?: No   Transportation Needs: Low Risk  (12/24/2023)    Transportation Needs     Within the past 12 months, has lack of transportation kept you from medical appointments, getting your medicines, non-medical meetings or appointments, work, or from getting things that you need?: No   Physical Activity: Not on file   Stress: Not on file   Social Connections: Not on file   Interpersonal Safety: Low Risk  (12/29/2023)    Interpersonal Safety     Do you feel physically and emotionally safe where you currently live?: Yes     Within the past 12 months, have you been hit, slapped, kicked or otherwise physically hurt by someone?: No     Within the past 12 months, have you been humiliated or emotionally abused in other ways by your partner or ex-partner?: No   Housing Stability: Low Risk  (12/24/2023)    Housing Stability     Do you have housing? : Yes     Are you worried about losing your housing?: No           Medications  Allergies   Current Outpatient Medications   Medication Sig Dispense Refill    diazepam (VALIUM) 5 MG tablet Take 1 tablet (5 mg) by mouth daily as needed for anxiety 5 tabs to last 30 days (Patient not taking: Reported on 1/19/2024) 5 tablet 0    emtricitabine-tenofovir (TRUVADA) 200-300 MG per tablet TAKE 1 TABLET BY MOUTH DAILY FOR HIV PREVENTION (Patient not taking: Reported on 1/19/2024)      lamoTRIgine (LAMICTAL) 100 MG tablet Take one tab by mouth for 2 weeks then increase to 1.5 tabs for 2 weeks then take 2 tabs daily. (Patient  "not taking: Reported on 12/29/2023) 60 tablet 1    lamoTRIgine (LAMICTAL) 25 MG tablet Take 25 mg by mouth daily for 2 weeks then 50 mg daily for 2 weeks then 100 mg daily. (Patient not taking: Reported on 12/29/2023) 42 tablet 0    OLANZapine (ZYPREXA) 10 MG tablet Take 0.5-1 tablets (5-10 mg) by mouth nightly as needed (racing thoughts, anxiety, sleep) (Patient not taking: Reported on 12/29/2023) 90 tablet 0       Allergies   Allergen Reactions    Flippin Rash          Lab Results    Chemistry/lipid CBC Cardiac Enzymes/BNP/TSH/INR   Recent Labs   Lab Test 01/09/24  1507   CHOL 186   HDL 43   *   TRIG 100     Recent Labs   Lab Test 01/09/24  1507   *     Recent Labs   Lab Test 01/07/24  1829      POTASSIUM 3.4   CHLORIDE 103   CO2 24   *   BUN 10.2   CR 0.78   GFRESTIMATED >90   YARI 10.0     Recent Labs   Lab Test 01/07/24  1829 12/08/23  1849 11/12/23  1837   CR 0.78 0.85 0.79     No results for input(s): \"A1C\" in the last 61329 hours.       Recent Labs   Lab Test 01/07/24  1829   WBC 8.3   HGB 17.0   HCT 48.6   MCV 89        Recent Labs   Lab Test 01/07/24  1829 12/08/23  1849 11/12/23  1923   HGB 17.0 16.6 17.0    Recent Labs   Lab Test 10/01/18  2003   TROPONINI <0.01     No results for input(s): \"BNP\", \"NTBNPI\", \"NTBNP\" in the last 06452 hours.  Recent Labs   Lab Test 11/12/23  1837   TSH 0.75     No results for input(s): \"INR\" in the last 81863 hours.       Angelica Tripp PA-C                                       "

## 2024-03-26 ENCOUNTER — OFFICE VISIT (OUTPATIENT)
Dept: CARDIOLOGY | Facility: CLINIC | Age: 34
End: 2024-03-26
Payer: COMMERCIAL

## 2024-03-26 VITALS
OXYGEN SATURATION: 97 % | HEIGHT: 68 IN | DIASTOLIC BLOOD PRESSURE: 70 MMHG | BODY MASS INDEX: 30.19 KG/M2 | HEART RATE: 85 BPM | SYSTOLIC BLOOD PRESSURE: 124 MMHG | RESPIRATION RATE: 16 BRPM | WEIGHT: 199.2 LBS

## 2024-03-26 DIAGNOSIS — R00.2 PALPITATIONS: Primary | ICD-10-CM

## 2024-03-26 DIAGNOSIS — R00.2 PALPITATIONS: ICD-10-CM

## 2024-03-26 PROCEDURE — 99214 OFFICE O/P EST MOD 30 MIN: CPT | Performed by: STUDENT IN AN ORGANIZED HEALTH CARE EDUCATION/TRAINING PROGRAM

## 2024-03-26 RX ORDER — PROPRANOLOL HYDROCHLORIDE 10 MG/1
10 TABLET ORAL 2 TIMES DAILY
Qty: 180 TABLET | Refills: 0 | Status: SHIPPED | OUTPATIENT
Start: 2024-03-26

## 2024-03-26 RX ORDER — PROPRANOLOL HYDROCHLORIDE 10 MG/1
10 TABLET ORAL 2 TIMES DAILY
Qty: 60 TABLET | Refills: 3 | Status: SHIPPED | OUTPATIENT
Start: 2024-03-26 | End: 2024-03-26

## 2024-03-26 NOTE — PATIENT INSTRUCTIONS
Arjun Vickers,    It was a pleasure to see you today at the [facility].     My recommendations after this visit include:    - Can start propranolol 10 mg daily  - Minimize stress, alcohol, caffeine  - Can continue with magnesium  - Follow up as needed if symptoms are persisting and we can consider increasing dose or repeating monitor    - Please call [phone], if you have any questions or concerns      Angelica Tripp PA-C

## 2024-03-26 NOTE — LETTER
3/26/2024    Sharan Caputo, NP  [address] [place] [address]    RE: Arjun Vickers       Dear Colleague,     I had the pleasure of seeing Arjun Vickers in the [facility].    HEART CARE ENCOUNTER NOTE      [facility]  [phone]      Assessment/Recommendations   Assessment:   Heart palpitations: Holter monitor 1/29/2024 showed less than 1% burden of PVCs and PACs with no other sustained arrhythmias.  Patient had previously noted the PVCs when he had anxiety.  Notes that happening more often and occur many times even when feeling relaxed.  Patient notes he will take his breath away when having them.  Notes he will have many in an hour and then they will improve for a little bit and then come back the next day.  Discussed with patient that these are benign and that his echocardiogram showed normal ejection fraction.  Patient is unsure if his anxiety is causing the PVCs or if the PVCs are making his anxiety worse.  Patient had been on propranolol 20 mg 3 times a day previously but had only taken it once a day as he noted heart rates in the 50s which scared him.  Patient denied lightheadedness at that time.  Reassured him and will try 10 mg tablets twice daily of propranolol.  Generalized anxiety disorder: With history of panic attacks  Bipolar disorder      Plan:   Start propranolol 10 mg twice daily for PVCs and anxiety  If patient develops lightheadedness or dizziness will let us know and can discontinue the propranolol  Can continue with magnesium as it has seemed to help the PVCs  Follow-up as needed         History of Present Illness/Subjective    HPI: Arjun Vickers is a 33 year old male with PMHx of heart palpitations, generalized anxiety disorder, bipolar disorder presents for follow-up.  Patient last saw Dr. Johnson 1/19/2024 for cardiac evaluation of heart palpitations.  Patient was noting them more when stressed out or sleep deprived.  Echocardiogram was  obtained that showed no structural abnormalities.  Holter monitor was obtained that showed less than 1% burden of PVCs and PACs with no other arrhythmias noted.  He was encouraged to work with psychiatrist/psychologist on improving mental health.    Patient notes that he was previously getting PVCs when he was anxious and are happening more often.  Notes that occur at random times even when he is not feeling stressed.  Patient notes that they will temporarily take his breath away.  Patient also notes some chest discomfort but that he feels it is more related to tensing his body due to anxiety.  Was in the ER and diagnosed with costochondritis.  Notes tenderness when pressing on the chest today.  Patient notes he will feel them when he gets off of the couch and starts moving as well as when lying down.  Patient has been avoiding caffeine and alcohol and takes magnesium.  Had some improvement until more recently with the palpitations.  Denies exertional chest discomfort or shortness of breath.       Echocardiogram 1/29/2024 results:  The left ventricle is normal in size with normal left ventricular wall  thickness. Left ventricular function is normal.The ejection fraction is 60-  65%.  Normal right ventricle size and systolic function.  No hemodynamically significant valvular abnormalities on 2D or color flow  imaging.  There is no comparison study available.    Holter monitor 1/29/2024:  Holter monitoring (duration 24hrs).  Predominant underlying rhythm was sinus rhythm, 59 to 114 bpm, average 77 bpm.  No nonsustained or sustained tachyarrhythmias.  No atrial fibrillation.  There were no pauses of greater than 3 seconds.  Rare supraventricular ectopic beats (<1%).  Rare premature ventricular contractions (<1%).  Symptom triggers -palpitations on 2 occasions correlated with normal sinus rhythm.     Electronically signed by Lan Avila MD  1/31/2024  4:16 PM     Physical Examination  Review of Systems   Vitals:  "/70 (BP Location: Left arm, Patient Position: Sitting, Cuff Size: Adult Regular)   Pulse 85   Resp 16   Ht 1.727 m (5' 8\")   Wt 90.4 kg (199 lb 3.2 oz)   SpO2 97%   BMI 30.29 kg/m    BMI= Body mass index is 30.29 kg/m .  Wt Readings from Last 3 Encounters:   03/26/24 90.4 kg (199 lb 3.2 oz)   01/19/24 91.6 kg (202 lb)   01/07/24 92.5 kg (204 lb)           ENT/Mouth: membranes moist, no oral lesions or bleeding gums.      EYES:  no scleral icterus, normal conjunctivae       Chest/Lungs:   lungs are clear to auscultation, no rales or wheezing,  equal chest wall expansion    Cardiovascular:   Regular. Normal first and second heart sounds with no murmurs, rubs, or gallops; the carotid, radial and posterior tibial pulses are intact       Extremities: no cyanosis or clubbing   Skin: no xanthelasma, warm.    Neurologic: no tremors     Psychiatric: alert and oriented x3, calm        Please refer above for cardiac ROS details.        Medical History  Surgical History Family History Social History   Past Medical History:   Diagnosis Date    Colitis     Suicidal ideations      Past Surgical History:   Procedure Laterality Date    OTHER SURGICAL HISTORY      none     Family History   Problem Relation Age of Onset    Bipolar Disorder Mother     Clotting Disorder Mother     No Known Problems Brother         Social History     Socioeconomic History    Marital status: Single     Spouse name: Not on file    Number of children: 0    Years of education: 12    Highest education level: Not on file   Occupational History    Not on file   Tobacco Use    Smoking status: Never     Passive exposure: Yes    Smokeless tobacco: Never   Substance and Sexual Activity    Alcohol use: Not Currently     Alcohol/week: 1.0 standard drink of alcohol     Comment: Alcoholic Drinks/day: occasionally binge    Drug use: Never     Types: Marijuana    Sexual activity: Yes     Partners: Male     Birth control/protection: Pill   Other Topics Concern "    Parent/sibling w/ CABG, MI or angioplasty before 65F 55M? No   Social History Narrative    Not on file     Social Determinants of Health     Financial Resource Strain: Low Risk  (12/24/2023)    Financial Resource Strain     Within the past 12 months, have you or your family members you live with been unable to get utilities (heat, electricity) when it was really needed?: No   Food Insecurity: Low Risk  (12/24/2023)    Food Insecurity     Within the past 12 months, did you worry that your food would run out before you got money to buy more?: No     Within the past 12 months, did the food you bought just not last and you didn t have money to get more?: No   Transportation Needs: Low Risk  (12/24/2023)    Transportation Needs     Within the past 12 months, has lack of transportation kept you from medical appointments, getting your medicines, non-medical meetings or appointments, work, or from getting things that you need?: No   Physical Activity: Not on file   Stress: Not on file   Social Connections: Not on file   Interpersonal Safety: Low Risk  (12/29/2023)    Interpersonal Safety     Do you feel physically and emotionally safe where you currently live?: Yes     Within the past 12 months, have you been hit, slapped, kicked or otherwise physically hurt by someone?: No     Within the past 12 months, have you been humiliated or emotionally abused in other ways by your partner or ex-partner?: No   Housing Stability: Low Risk  (12/24/2023)    Housing Stability     Do you have housing? : Yes     Are you worried about losing your housing?: No           Medications  Allergies   Current Outpatient Medications   Medication Sig Dispense Refill    diazepam (VALIUM) 5 MG tablet Take 1 tablet (5 mg) by mouth daily as needed for anxiety 5 tabs to last 30 days (Patient not taking: Reported on 1/19/2024) 5 tablet 0    emtricitabine-tenofovir (TRUVADA) 200-300 MG per tablet TAKE 1 TABLET BY MOUTH DAILY FOR HIV PREVENTION (Patient  "not taking: Reported on 1/19/2024)      lamoTRIgine (LAMICTAL) 100 MG tablet Take one tab by mouth for 2 weeks then increase to 1.5 tabs for 2 weeks then take 2 tabs daily. (Patient not taking: Reported on 12/29/2023) 60 tablet 1    lamoTRIgine (LAMICTAL) 25 MG tablet Take 25 mg by mouth daily for 2 weeks then 50 mg daily for 2 weeks then 100 mg daily. (Patient not taking: Reported on 12/29/2023) 42 tablet 0    OLANZapine (ZYPREXA) 10 MG tablet Take 0.5-1 tablets (5-10 mg) by mouth nightly as needed (racing thoughts, anxiety, sleep) (Patient not taking: Reported on 12/29/2023) 90 tablet 0       Allergies   Allergen Reactions    Al[place] Rash          Lab Results    Chemistry/lipid CBC Cardiac Enzymes/BNP/TSH/INR   Recent Labs   Lab Test 01/09/24  1507   CHOL 186   HDL 43   *   TRIG 100     Recent Labs   Lab Test 01/09/24  1507   *     Recent Labs   Lab Test 01/07/24  1829      POTASSIUM 3.4   CHLORIDE 103   CO2 24   *   BUN 10.2   CR 0.78   GFRESTIMATED >90   YARI 10.0     Recent Labs   Lab Test 01/07/24  1829 12/08/23  1849 11/12/23  1837   CR 0.78 0.85 0.79     No results for input(s): \"A1C\" in the last 06011 hours.       Recent Labs   Lab Test 01/07/24  1829   WBC 8.3   HGB 17.0   HCT 48.6   MCV 89        Recent Labs   Lab Test 01/07/24 1829 12/08/23  1849 11/12/23  1923   HGB 17.0 16.6 17.0    Recent Labs   Lab Test 10/01/18  2003   TROPONINI <0.01     No results for input(s): \"BNP\", \"NTBNPI\", \"NTBNP\" in the last 46594 hours.  Recent Labs   Lab Test 11/12/23  1837   TSH 0.75     No results for input(s): \"INR\" in the last 22411 hours.       Angelica Tripp PA-C      Thank you for allowing me to participate in the care of your patient.    Sincerely,   TOM Crespo [facility]  cc: No referring provider defined for this encounter.  "

## 2024-07-28 ASSESSMENT — ANXIETY QUESTIONNAIRES
6. BECOMING EASILY ANNOYED OR IRRITABLE: SEVERAL DAYS
GAD7 TOTAL SCORE: 17
2. NOT BEING ABLE TO STOP OR CONTROL WORRYING: NEARLY EVERY DAY
7. FEELING AFRAID AS IF SOMETHING AWFUL MIGHT HAPPEN: NEARLY EVERY DAY
1. FEELING NERVOUS, ANXIOUS, OR ON EDGE: NEARLY EVERY DAY
GAD7 TOTAL SCORE: 17
3. WORRYING TOO MUCH ABOUT DIFFERENT THINGS: NEARLY EVERY DAY
5. BEING SO RESTLESS THAT IT IS HARD TO SIT STILL: SEVERAL DAYS
4. TROUBLE RELAXING: NEARLY EVERY DAY
7. FEELING AFRAID AS IF SOMETHING AWFUL MIGHT HAPPEN: NEARLY EVERY DAY
IF YOU CHECKED OFF ANY PROBLEMS ON THIS QUESTIONNAIRE, HOW DIFFICULT HAVE THESE PROBLEMS MADE IT FOR YOU TO DO YOUR WORK, TAKE CARE OF THINGS AT HOME, OR GET ALONG WITH OTHER PEOPLE: EXTREMELY DIFFICULT
8. IF YOU CHECKED OFF ANY PROBLEMS, HOW DIFFICULT HAVE THESE MADE IT FOR YOU TO DO YOUR WORK, TAKE CARE OF THINGS AT HOME, OR GET ALONG WITH OTHER PEOPLE?: EXTREMELY DIFFICULT

## 2024-08-02 ENCOUNTER — OFFICE VISIT (OUTPATIENT)
Dept: FAMILY MEDICINE | Facility: CLINIC | Age: 34
End: 2024-08-02
Payer: COMMERCIAL

## 2024-08-02 VITALS
BODY MASS INDEX: 30.31 KG/M2 | OXYGEN SATURATION: 93 % | HEIGHT: 68 IN | WEIGHT: 200 LBS | TEMPERATURE: 98 F | HEART RATE: 79 BPM | DIASTOLIC BLOOD PRESSURE: 70 MMHG | RESPIRATION RATE: 16 BRPM | SYSTOLIC BLOOD PRESSURE: 110 MMHG

## 2024-08-02 DIAGNOSIS — F41.9 ANXIETY: Primary | ICD-10-CM

## 2024-08-02 DIAGNOSIS — F30.9 BIPOLAR I DISORDER, SINGLE MANIC EPISODE (H): ICD-10-CM

## 2024-08-02 DIAGNOSIS — L72.3 SEBACEOUS CYST: ICD-10-CM

## 2024-08-02 PROCEDURE — 99214 OFFICE O/P EST MOD 30 MIN: CPT | Performed by: NURSE PRACTITIONER

## 2024-08-02 ASSESSMENT — PAIN SCALES - GENERAL: PAINLEVEL: NO PAIN (0)

## 2024-08-02 NOTE — PROGRESS NOTES
"Assessment & Plan     ICD-10-CM    1. Anxiety  F41.9 Adult Mental Health  Referral      2. Sebaceous cyst  L72.3 Adult Dermatology  Referral      3. Bipolar Disorder  F30.9 Adult Mental Health  Referral        Given multiple cysts, best removed through dermatology.  They will likely be able to preserve as much of his tattoo as well.  Given psychiatric history, would best be served by psychiatry.  Referral placed for second opinion on long-term management outside organization.    Total time spent was 30 minutes including reviewing records prior to arrival, consultation, placing orders, education, and reviewing the plan of care on the date of service.      Reviewed cardiology note x 1, psychiatry note x 2, ED note x 1    Subjective     HPI     Patient presents today with concerns of anxiety, skin lumps, and left-sided numbness.    Anxiety is a longstanding issue.  Was working with psychiatry in 2023.  Tried and failed multiple medications.  There was a diagnosis of bipolar noted.  Patient only found benefit with Valium.  He is most interested in trying chronic benzodiazepine therapy since it seems to have worked the best.    Has been dealing with left arm and left leg numbness.  Triggered with anxiety/panic attacks.  He does note some focal numbness in fingers 4 and 5.  Oftentimes will flex his wrist which he thinks also may be a trigger.    Patient has multiple subcutaneous lumps/nodules.  Present for years.  Sometimes uncomfortable.  Interested in removal.          Review of Systems - negative except for what's listed in the HPI      Objective    /70 (BP Location: Left arm, Patient Position: Sitting, Cuff Size: Adult Regular)   Pulse 79   Temp 98  F (36.7  C) (Oral)   Resp 16   Ht 1.727 m (5' 8\")   Wt 90.7 kg (200 lb)   SpO2 93%   BMI 30.41 kg/m    Physical Exam   General appearance - alert, well appearing, and in no distress  Mental status - alert, oriented to person, place, " and time.  Thought process clear and linear.  Good historian.  Appropriately dressed.  Makes appropriate eye contact.  Fidgety.  Neurological - alert, oriented, normal speech, no focal findings or movement disorder noted, neck supple without rigidity, cranial nerves II through XII intact, motor and sensory grossly normal bilaterally, normal muscle tone, no tremors, strength 5/5  Musculoskeletal - no joint tenderness, deformity or swelling  Extremities - peripheral pulses normal, no peripheral edema  Skin -along both arms there firm mobile lumps with the largest measuring approximately 5 mm in diameter.    Sharan Caputo, CNP    This note has been dictated using voice recognition software. Any grammatical or context distortions are unintentional and inherent to the software.

## 2024-08-02 NOTE — PATIENT INSTRUCTIONS
If your goal for psych/anxiety management is for chronic benzodiazepine therapy then we would need to reconnect with psychiatry.  We can go outside of [facility] so I did place a referral to [facility].  They have locations in [place] and [place].  Scheduling number is highlighted in your paperwork.    You may have some nerve compression causing some of the tingling in the pinky and ring finger.  A wrist brace can sometimes help to provide some compression and keep the wrist straight which can prevent the nerve compression and may help.  If this does not help, we can do that EMG nerve zap test we talked about.    Given the multiple cysts, I think it would be best to have these removed through the dermatologist.  Referral placed and contact information is in your paperwork.

## 2024-12-04 ENCOUNTER — OFFICE VISIT (OUTPATIENT)
Dept: FAMILY MEDICINE | Facility: CLINIC | Age: 34
End: 2024-12-04

## 2024-12-04 VITALS
OXYGEN SATURATION: 96 % | WEIGHT: 191.5 LBS | BODY MASS INDEX: 29.02 KG/M2 | SYSTOLIC BLOOD PRESSURE: 120 MMHG | TEMPERATURE: 98.3 F | DIASTOLIC BLOOD PRESSURE: 76 MMHG | HEART RATE: 80 BPM | RESPIRATION RATE: 18 BRPM | HEIGHT: 68 IN

## 2024-12-04 DIAGNOSIS — R10.31 RLQ ABDOMINAL PAIN: ICD-10-CM

## 2024-12-04 DIAGNOSIS — K92.1 HEMATOCHEZIA: ICD-10-CM

## 2024-12-04 DIAGNOSIS — R19.7 DIARRHEA OF PRESUMED INFECTIOUS ORIGIN: Primary | ICD-10-CM

## 2024-12-04 LAB
ALBUMIN SERPL BCG-MCNC: 4.5 G/DL (ref 3.5–5.2)
ALP SERPL-CCNC: 126 U/L (ref 40–150)
ALT SERPL W P-5'-P-CCNC: 34 U/L (ref 0–70)
ANION GAP SERPL CALCULATED.3IONS-SCNC: 14 MMOL/L (ref 7–15)
AST SERPL W P-5'-P-CCNC: 27 U/L (ref 0–45)
BILIRUB SERPL-MCNC: 0.7 MG/DL
BUN SERPL-MCNC: 10.4 MG/DL (ref 6–20)
CALCIUM SERPL-MCNC: 9.7 MG/DL (ref 8.8–10.4)
CHLORIDE SERPL-SCNC: 105 MMOL/L (ref 98–107)
CREAT SERPL-MCNC: 0.76 MG/DL (ref 0.67–1.17)
EGFRCR SERPLBLD CKD-EPI 2021: >90 ML/MIN/1.73M2
ERYTHROCYTE [DISTWIDTH] IN BLOOD BY AUTOMATED COUNT: 14 % (ref 10–15)
GLUCOSE SERPL-MCNC: 114 MG/DL (ref 70–99)
HCO3 SERPL-SCNC: 21 MMOL/L (ref 22–29)
HCT VFR BLD AUTO: 45.9 % (ref 40–53)
HGB BLD-MCNC: 15.4 G/DL (ref 13.3–17.7)
MCH RBC QN AUTO: 30 PG (ref 26.5–33)
MCHC RBC AUTO-ENTMCNC: 33.6 G/DL (ref 31.5–36.5)
MCV RBC AUTO: 89 FL (ref 78–100)
PLATELET # BLD AUTO: 186 10E3/UL (ref 150–450)
POTASSIUM SERPL-SCNC: 3.9 MMOL/L (ref 3.4–5.3)
PROT SERPL-MCNC: 7.7 G/DL (ref 6.4–8.3)
RBC # BLD AUTO: 5.14 10E6/UL (ref 4.4–5.9)
SODIUM SERPL-SCNC: 140 MMOL/L (ref 135–145)
WBC # BLD AUTO: 5 10E3/UL (ref 4–11)

## 2024-12-04 PROCEDURE — 99214 OFFICE O/P EST MOD 30 MIN: CPT | Performed by: NURSE PRACTITIONER

## 2024-12-04 PROCEDURE — 85027 COMPLETE CBC AUTOMATED: CPT | Performed by: NURSE PRACTITIONER

## 2024-12-04 PROCEDURE — 80053 COMPREHEN METABOLIC PANEL: CPT | Performed by: NURSE PRACTITIONER

## 2024-12-04 PROCEDURE — 82653 EL-1 FECAL QUANTITATIVE: CPT | Performed by: NURSE PRACTITIONER

## 2024-12-04 PROCEDURE — 36415 COLL VENOUS BLD VENIPUNCTURE: CPT | Performed by: NURSE PRACTITIONER

## 2024-12-04 PROCEDURE — 87493 C DIFF AMPLIFIED PROBE: CPT | Performed by: NURSE PRACTITIONER

## 2024-12-04 PROCEDURE — 87507 IADNA-DNA/RNA PROBE TQ 12-25: CPT | Performed by: NURSE PRACTITIONER

## 2024-12-04 ASSESSMENT — PAIN SCALES - GENERAL: PAINLEVEL_OUTOF10: NO PAIN (0)

## 2024-12-04 NOTE — PROGRESS NOTES
Assessment & Plan     ICD-10-CM    1. Diarrhea of presumed infectious origin  R19.7 C. difficile Toxin B PCR with reflex to C. difficile Antigen and Toxins A/B EIA     Enteric Bacteria and Virus Panel by NATHALY Stool     Elastase Fecal     Calprotectin Feces     Comprehensive metabolic panel (BMP + Alb, Alk Phos, ALT, AST, Total. Bili, TP)     CBC with platelets     CT Abdomen Pelvis w Contrast     Ova and Parasite Exam Routine     C. difficile Toxin B PCR with reflex to C. difficile Antigen and Toxins A/B EIA     Enteric Bacteria and Virus Panel by NATHALY Stool     Elastase Fecal     Calprotectin Feces     Comprehensive metabolic panel (BMP + Alb, Alk Phos, ALT, AST, Total. Bili, TP)     CBC with platelets     Ova and Parasite Exam Routine      2. RLQ abdominal pain  R10.31 CT Abdomen Pelvis w Contrast      3. Hematochezia  K92.1 CT Abdomen Pelvis w Contrast        Diarrhea for 3 months with abdominal pain.  Some hematochezia noted.  Could be hemorrhoids.  Start with labs.  Rule out infectious/inflammatory/pancreatic insufficiency origins.  Vital signs stable.  Given significant tenderness with palpation along lower abdomen will also get CT scan.    Total time spent was 30 minutes including reviewing records prior to arrival, consultation, placing orders, education, and reviewing the plan of care on the date of service.      Subjective     HPI     Patient presents today with complaints of diarrhea for the past 3 months.  No adventurous eating.  No foreign travel.  Occasionally has had sushi but is from reputable sources.  Weight has been fluctuating.  It diarrhea will be upwards of 20-30 times a day.  Mucous and blood has been seen.  No obvious parasites.  No skin changes.  Appetite is baseline.  Lots of stomach/abdominal gurgling.  Has been getting lower abdominal pain.  Drinks tap water.  There is family history in  grandfather and great grandfather of colon cancer.  Was diagnosed with colitis back in 2016 and there  "was discussion of sigmoidoscopy but it does not sound like that happened.  Past infection of Campylobacter 8 years ago.      Review of Systems - negative except for what's listed in the HPI      Objective    /76 (BP Location: Right arm, Patient Position: Sitting, Cuff Size: Adult Regular)   Pulse 80   Temp 98.3  F (36.8  C) (Oral)   Resp 18   Ht 1.727 m (5' 8\")   Wt 86.9 kg (191 lb 8 oz)   SpO2 96%   BMI 29.12 kg/m    Physical Exam   General appearance - alert, well appearing, and in no distress  Mental status - alert, oriented to person, place, and time  Eyes -sclera white  Mouth - mucous membranes moist.   Lymphatics - no palpable lymphadenopathy  Abdomen - soft, tenderness with palpation along right lower abdomen.  No rigidity. Nondistended, no masses or organomegaly  Neurological - alert, oriented, normal speech, no focal findings or movement disorder noted, neck supple without rigidity, cranial nerves II through XII intact, motor and sensory grossly normal bilaterally, normal muscle tone, no tremors, strength 5/5  Extremities - peripheral pulses normal, no peripheral edema  Skin - normal coloration and turgor.    Sharan Caputo, CNP    This note has been dictated using voice recognition software. Any grammatical or context distortions are unintentional and inherent to the software.    "

## 2024-12-04 NOTE — PATIENT INSTRUCTIONS
We are going to do some blood test to keep an eye on the organs and also you will go home with the stool collection kit.  Feel free to bring that back to the clinic during business hours as soon as you are able to get it done.    Given the tenderness when I pressed on the lower abdomen I also ordered the CT scan that we talked about.  Scheduling information is highlighted in your paperwork.    Once we get all the information/results back we will put it together and figure out a treatment plan to get you back to feeling like yourself!

## 2024-12-05 LAB

## 2025-02-15 ENCOUNTER — HEALTH MAINTENANCE LETTER (OUTPATIENT)
Age: 35
End: 2025-02-15